# Patient Record
Sex: FEMALE | Race: WHITE | HISPANIC OR LATINO | Employment: FULL TIME | URBAN - METROPOLITAN AREA
[De-identification: names, ages, dates, MRNs, and addresses within clinical notes are randomized per-mention and may not be internally consistent; named-entity substitution may affect disease eponyms.]

---
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
## 2021-02-11 ENCOUNTER — HOSPITAL ENCOUNTER (OUTPATIENT)
Facility: MEDICAL CENTER | Age: 21
End: 2021-02-11
Attending: NURSE PRACTITIONER
Payer: COMMERCIAL

## 2021-02-11 ENCOUNTER — EMPLOYEE HEALTH (OUTPATIENT)
Dept: OCCUPATIONAL MEDICINE | Facility: CLINIC | Age: 21
End: 2021-02-11

## 2021-02-11 ENCOUNTER — EH NON-PROVIDER (OUTPATIENT)
Dept: OCCUPATIONAL MEDICINE | Facility: CLINIC | Age: 21
End: 2021-02-11

## 2021-02-11 DIAGNOSIS — Z02.1 PRE-EMPLOYMENT HEALTH SCREENING EXAMINATION: ICD-10-CM

## 2021-02-11 DIAGNOSIS — Z02.1 PRE-EMPLOYMENT DRUG SCREENING: Primary | ICD-10-CM

## 2021-02-11 DIAGNOSIS — Z02.1 PRE-EMPLOYMENT DRUG SCREENING: ICD-10-CM

## 2021-02-11 LAB
AMP AMPHETAMINE: NORMAL
BAR BARBITURATES: NORMAL
BZO BENZODIAZEPINES: NORMAL
COC COCAINE: NORMAL
INT CON NEG: NORMAL
INT CON POS: NORMAL
MDMA ECSTASY: NORMAL
MET METHAMPHETAMINES: NORMAL
MTD METHADONE: NORMAL
OPI OPIATES: NORMAL
OXY OXYCODONE: NORMAL
PCP PHENCYCLIDINE: NORMAL
POC URINE DRUG SCREEN OCDRS: NEGATIVE
THC: NORMAL

## 2021-02-11 PROCEDURE — 8915 PR COMPREHENSIVE PHYSICAL: Performed by: PREVENTIVE MEDICINE

## 2021-02-11 PROCEDURE — 94375 RESPIRATORY FLOW VOLUME LOOP: CPT | Performed by: NURSE PRACTITIONER

## 2021-02-11 PROCEDURE — 90686 IIV4 VACC NO PRSV 0.5 ML IM: CPT | Performed by: NURSE PRACTITIONER

## 2021-02-11 PROCEDURE — 80305 DRUG TEST PRSMV DIR OPT OBS: CPT | Performed by: NURSE PRACTITIONER

## 2021-02-11 PROCEDURE — 90715 TDAP VACCINE 7 YRS/> IM: CPT | Performed by: NURSE PRACTITIONER

## 2021-02-11 PROCEDURE — 86480 TB TEST CELL IMMUN MEASURE: CPT | Performed by: NURSE PRACTITIONER

## 2021-02-15 LAB
GAMMA INTERFERON BACKGROUND BLD IA-ACNC: 0.33 IU/ML
M TB IFN-G BLD-IMP: NEGATIVE
M TB IFN-G CD4+ BCKGRND COR BLD-ACNC: -0.02 IU/ML
MITOGEN IGNF BCKGRD COR BLD-ACNC: 5.9 IU/ML
QFT TB2 - NIL TBQ2: -0.04 IU/ML

## 2021-02-22 ENCOUNTER — EH NON-PROVIDER (OUTPATIENT)
Dept: OCCUPATIONAL MEDICINE | Facility: CLINIC | Age: 21
End: 2021-02-22

## 2021-02-22 DIAGNOSIS — Z71.85 IMMUNIZATION COUNSELING: ICD-10-CM

## 2021-09-02 ENCOUNTER — HOSPITAL ENCOUNTER (OUTPATIENT)
Facility: MEDICAL CENTER | Age: 21
End: 2021-09-02
Attending: NURSE PRACTITIONER
Payer: OTHER GOVERNMENT

## 2021-09-02 ENCOUNTER — OFFICE VISIT (OUTPATIENT)
Dept: URGENT CARE | Facility: CLINIC | Age: 21
End: 2021-09-02
Payer: OTHER GOVERNMENT

## 2021-09-02 VITALS
RESPIRATION RATE: 16 BRPM | SYSTOLIC BLOOD PRESSURE: 110 MMHG | OXYGEN SATURATION: 97 % | TEMPERATURE: 98.4 F | HEIGHT: 66 IN | BODY MASS INDEX: 22.95 KG/M2 | WEIGHT: 142.8 LBS | HEART RATE: 82 BPM | DIASTOLIC BLOOD PRESSURE: 60 MMHG

## 2021-09-02 DIAGNOSIS — J06.9 UPPER RESPIRATORY INFECTION, ACUTE: Primary | ICD-10-CM

## 2021-09-02 DIAGNOSIS — R53.83 FATIGUE, UNSPECIFIED TYPE: ICD-10-CM

## 2021-09-02 DIAGNOSIS — R09.81 NASAL CONGESTION: ICD-10-CM

## 2021-09-02 DIAGNOSIS — R05.9 COUGH: ICD-10-CM

## 2021-09-02 DIAGNOSIS — R07.89 CHEST PRESSURE: ICD-10-CM

## 2021-09-02 LAB — COVID ORDER STATUS COVID19: NORMAL

## 2021-09-02 PROCEDURE — U0003 INFECTIOUS AGENT DETECTION BY NUCLEIC ACID (DNA OR RNA); SEVERE ACUTE RESPIRATORY SYNDROME CORONAVIRUS 2 (SARS-COV-2) (CORONAVIRUS DISEASE [COVID-19]), AMPLIFIED PROBE TECHNIQUE, MAKING USE OF HIGH THROUGHPUT TECHNOLOGIES AS DESCRIBED BY CMS-2020-01-R: HCPCS

## 2021-09-02 PROCEDURE — U0005 INFEC AGEN DETEC AMPLI PROBE: HCPCS

## 2021-09-02 PROCEDURE — 99203 OFFICE O/P NEW LOW 30 MIN: CPT | Performed by: NURSE PRACTITIONER

## 2021-09-02 NOTE — LETTER
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  A concern for COVID-19 was identified and testing was done.  We are asking that you excuse work absences while following the self-isolation protocol per the Center for Disease Control (CDC) guidelines.  Your employee is able to access the test results through [facility]'s electronic delivery system called [employer].   If the results of testing were negative, and once there has been no fever(temperature greater than 100.4 °F) for at least 48 hours without taking any acetaminophen or ibuprofen, and no vomiting or diarrhea for at least 48 hours, then return to work is approved without restrictions.  If the results of the testing were positive, your employee will be contacted by the [facility] or [facility] department for further instructions on duration of self-isolation and return to work protocol.  In general this will also allow the CDC guidelines with a minimum of 10 days from the onset of symptoms and without fever, vomiting, or diarrhea.    In general repeat testing is not necessary and not offered through [facility].  Repeat testing is also not recommended by the CDC.  This is the only note that will be provided from the [facility] for this illness. \      Sincerely,        APOORVA Wright.  Electronically Signed

## 2021-09-03 LAB
SARS-COV-2 RNA RESP QL NAA+PROBE: NOTDETECTED
SPECIMEN SOURCE: NORMAL

## 2021-09-09 ASSESSMENT — ENCOUNTER SYMPTOMS
NAUSEA: 0
SINUS PAIN: 0
EYE REDNESS: 0
CHILLS: 0
FEVER: 0
PALPITATIONS: 0
HEADACHES: 0
EYE DISCHARGE: 0
COUGH: 1
SHORTNESS OF BREATH: 0
SORE THROAT: 0

## 2021-09-09 ASSESSMENT — LIFESTYLE VARIABLES: SUBSTANCE_ABUSE: 0

## 2021-09-09 NOTE — PROGRESS NOTES
Karrie Yousif is a 20 y.o. female who presents for Chest Pressure (runny nose, congested, cough, burning feeling, x1 day wants an xray )      HPI this new problem.  Karrie is a 20-year-old female presents with chest pressure, runny nose, nasal congestion, fatigue, cough, burning feeling in her chest.  She is wondering if she needs a chest x-ray.  She is not vaccinated against COVID-19 virus.  She has no known exposures to persons with Covid.  She has no history of pneumonia.  No underlying cardiopulmonary disorders.  She reports that she is normally healthy and well but her chest feels different.  She denies shortness of breath but has to think about her breathing more and take deep breaths more.  Her cough is nonproductive.  Treatments tried none.  No other aggravating or alleviating factors.    Review of Systems   Constitutional: Negative for chills, fever and malaise/fatigue.   HENT: Positive for congestion. Negative for ear pain, sinus pain and sore throat.    Eyes: Negative for discharge and redness.   Respiratory: Positive for cough. Negative for shortness of breath.    Cardiovascular: Negative for chest pain and palpitations.   Gastrointestinal: Negative for nausea.   Neurological: Negative for headaches.   Endo/Heme/Allergies: Negative for environmental allergies.   Psychiatric/Behavioral: Negative for substance abuse.       No Known Allergies  History reviewed. No pertinent past medical history.  History reviewed. No pertinent surgical history.  History reviewed. No pertinent family history.  Social History     Tobacco Use   • Smoking status: Never Smoker   Substance Use Topics   • Alcohol use: No     There is no problem list on file for this patient.    No current outpatient medications on file prior to visit.     No current facility-administered medications on file prior to visit.          Objective:     /60 (BP Location: Right arm, Patient Position: Sitting, BP Cuff Size: Adult long)   Pulse 82  "  Temp 36.9 °C (98.4 °F) (Temporal)   Resp 16   Ht 1.676 m (5' 6\")   Wt 64.8 kg (142 lb 12.8 oz)   SpO2 97%   BMI 23.05 kg/m²     Physical Exam  Vitals and nursing note reviewed.   Constitutional:       General: She is not in acute distress.     Appearance: Normal appearance. She is well-developed and normal weight. She is not ill-appearing or toxic-appearing.   HENT:      Head: Normocephalic.      Right Ear: Hearing, ear canal and external ear normal. No middle ear effusion. Tympanic membrane is injected. Tympanic membrane is not erythematous.      Left Ear: Hearing, ear canal and external ear normal.  No middle ear effusion. Tympanic membrane is injected. Tympanic membrane is not erythematous.      Nose: Congestion and rhinorrhea present. No mucosal edema.      Right Sinus: No maxillary sinus tenderness or frontal sinus tenderness.      Left Sinus: No maxillary sinus tenderness or frontal sinus tenderness.      Mouth/Throat:      Pharynx: Uvula midline. Posterior oropharyngeal erythema (mild) present. No oropharyngeal exudate.      Tonsils: No tonsillar abscesses.   Eyes:      Pupils: Pupils are equal, round, and reactive to light.   Neck:      Trachea: Trachea normal.   Cardiovascular:      Rate and Rhythm: Normal rate and regular rhythm.      Chest Wall: PMI is not displaced.      Pulses: Normal pulses.      Heart sounds: Normal heart sounds.   Pulmonary:      Effort: Pulmonary effort is normal. No respiratory distress.      Breath sounds: Normal breath sounds. No decreased breath sounds, wheezing, rhonchi or rales.   Abdominal:      Palpations: Abdomen is soft.      Tenderness: There is no abdominal tenderness.   Musculoskeletal:         General: Normal range of motion.      Cervical back: Full passive range of motion without pain, normal range of motion and neck supple.   Lymphadenopathy:      Cervical: No cervical adenopathy.      Upper Body:      Right upper body: No supraclavicular adenopathy.      " Left upper body: No supraclavicular adenopathy.   Skin:     General: Skin is warm and dry.      Capillary Refill: Capillary refill takes less than 2 seconds.   Neurological:      Mental Status: She is alert and oriented to person, place, and time.      Gait: Gait normal.   Psychiatric:         Mood and Affect: Mood normal.         Speech: Speech normal.         Behavior: Behavior normal.         Thought Content: Thought content normal.         Assessment /Associated Orders:      1. Upper respiratory infection, acute     2. Cough  SARS-CoV-2 PCR (24 hour In-House): Collect NP swab in [facility]   3. Nasal congestion     4. Fatigue, unspecified type     5. Chest pressure           Medical Decision Making:    Pt is clinically stable at today's acute urgent care visit.  No acute distress noted. Appropriate for outpatient management at this time.   Her lungs are clear today.  There is no indication for chest x-ray.  COVID PCR testing - pending- (results to be released to [employer] if available)   She was educated that testing today could lead to a false negative test as she has only had symptoms for approximately 24 hours.  She verbalizes understanding.  She agrees to retest if her symptoms persist.    Self Quarantine per CDC guidelines  Educated in infection control practices.   Discussed that this illness was most likely viral in nature. Did not see any evidence of a bacterial process.   OTC  analgesic/ antipyretic of choice ( acetaminophen or NSAID). Follow manufactures dosing and safety precautions.   OTC medications for symptomatic relief of symptoms  Keep well hydrated  Increase rest    Advised to follow-up with the primary care provider  for recheck, reevaluation, and consideration of further management if necessary.   Discussed management options (risks,benefits, and alternatives to treatment). Pt/ caregiver expressed understanding and the treatment plan was agreed upon. Questions were encouraged and answered     Return  to urgent care prn if new or worsening sx or if there is no improvement in condition prn . Red flags discussed and indications to immediately call 911 or present to the Emergency Department.     I personally reviewed prior external notes and test results pertinent to today's visit.  I have independently reviewed and interpreted all diagnostics ordered during this urgent care acute visit.   Time spent evaluating this patient was at least 30 minutes and includes preparing for visit, counseling/education, exam and evaluation, obtaining history, independent interpretation, ordering lab/test/procedures,medication management and documentation.This does not include time spent on separate billable procedures.           Please note that this dictation was created using voice recognition software. I have made a reasonable attempt to correct obvious errors, but I expect that there are errors of grammar and possibly content that I did not discover before finalizing the note.    This note was electronically signed by Tiffany CAMARILLO, Urgent Care

## 2021-11-08 ENCOUNTER — HOSPITAL ENCOUNTER (OUTPATIENT)
Facility: MEDICAL CENTER | Age: 21
End: 2021-11-08
Attending: STUDENT IN AN ORGANIZED HEALTH CARE EDUCATION/TRAINING PROGRAM

## 2021-11-08 ENCOUNTER — OFFICE VISIT (OUTPATIENT)
Dept: URGENT CARE | Facility: CLINIC | Age: 21
End: 2021-11-08

## 2021-11-08 VITALS
OXYGEN SATURATION: 97 % | HEART RATE: 69 BPM | WEIGHT: 141 LBS | SYSTOLIC BLOOD PRESSURE: 102 MMHG | TEMPERATURE: 97.3 F | RESPIRATION RATE: 16 BRPM | BODY MASS INDEX: 22.66 KG/M2 | HEIGHT: 66 IN | DIASTOLIC BLOOD PRESSURE: 72 MMHG

## 2021-11-08 DIAGNOSIS — N76.0 VAGINOSIS: ICD-10-CM

## 2021-11-08 DIAGNOSIS — Z20.2 POSSIBLE EXPOSURE TO STD: ICD-10-CM

## 2021-11-08 PROCEDURE — 87491 CHLMYD TRACH DNA AMP PROBE: CPT

## 2021-11-08 PROCEDURE — 99213 OFFICE O/P EST LOW 20 MIN: CPT | Performed by: STUDENT IN AN ORGANIZED HEALTH CARE EDUCATION/TRAINING PROGRAM

## 2021-11-08 PROCEDURE — 87510 GARDNER VAG DNA DIR PROBE: CPT

## 2021-11-08 PROCEDURE — 87480 CANDIDA DNA DIR PROBE: CPT

## 2021-11-08 PROCEDURE — 87660 TRICHOMONAS VAGIN DIR PROBE: CPT

## 2021-11-08 PROCEDURE — 87591 N.GONORRHOEAE DNA AMP PROB: CPT

## 2021-11-08 NOTE — PROGRESS NOTES
"Subjective:   CHIEF COMPLAINT  Chief Complaint   Patient presents with   • Other     foul odor x 1 week - would like STD testing       HPI  Karrie Yousif is a 21 y.o. female who presents requesting be tested for STDs. Says she recently got out of relation ship with her boyfriend would like to be tested.  For the last week she has been having a foul odor from her vagina associated with some white discharge.  No dysuria or hematuria.  Started her menstrual cycle yesterday.  No history of STIs.    REVIEW OF SYSTEMS  General: no fever or chills  GI: no nausea or vomiting  See HPI for further details.    PAST MEDICAL HISTORY  There are no problems to display for this patient.      SURGICAL HISTORY  patient denies any surgical history    ALLERGIES  No Known Allergies    CURRENT MEDICATIONS  Home Medications     Reviewed by Tao Cid'alfonso (Medical Assistant) on 11/08/21 at 0930  Med List Status: <None>   Medication Last Dose Status        Patient Michael Taking any Medications                       SOCIAL HISTORY  Social History     Tobacco Use   • Smoking status: Never Smoker   • Smokeless tobacco: Never Used   Substance and Sexual Activity   • Alcohol use: Yes     Comment: occasional   • Drug use: No   • Sexual activity: Yes     Partners: Male     Birth control/protection: Condom       FAMILY HISTORY  No family history on file.       Objective:   PHYSICAL EXAM  VITAL SIGNS: /72 (BP Location: Left arm, Patient Position: Sitting, BP Cuff Size: Adult long)   Pulse 69   Temp 36.3 °C (97.3 °F) (Temporal)   Resp 16   Ht 1.676 m (5' 6\") Comment: pt stated  Wt 64 kg (141 lb)   LMP 11/07/2021 (Exact Date)   SpO2 97%   Breastfeeding No   BMI 22.76 kg/m²     Gen: no acute distress, normal voice  Skin: dry, intact, moist mucosal membranes  Head: Atraumatic, normocephalic  Psych: normal affect, normal judgement, alert, awake         Assessment/Plan:     1. Vaginosis  Chlamydia/GC PCR Urine Or Swab    " VAGINAL PATHOGENS DNA PANEL   2. Possible exposure to STD  Chlamydia/GC PCR Urine Or Swab   Suspect underlying BV infection.  Will get confirmation through vaginal pathogen swab before starting medication.  Patient currently on menstrual cycle.  -Ordered vaginal pathogens  -Order GC/CT  -Contact patient with results at [phone].  Okay to leave voicemail.    Differential diagnosis, natural history, supportive care, and indications for immediate follow-up discussed. All questions answered. Patient agrees with the plan of care.    Follow-up as needed if symptoms worsen or fail to improve to PCP, Urgent care or Emergency Room.    Please note that this dictation was created using voice recognition software. I have made a reasonable attempt to correct obvious errors, but I expect that there are errors of grammar and possibly content that I did not discover before finalizing the note.

## 2021-11-09 DIAGNOSIS — N76.0 VAGINOSIS: ICD-10-CM

## 2021-11-09 DIAGNOSIS — Z20.2 POSSIBLE EXPOSURE TO STD: ICD-10-CM

## 2021-11-09 LAB
C TRACH DNA SPEC QL NAA+PROBE: NEGATIVE
CANDIDA DNA VAG QL PROBE+SIG AMP: NEGATIVE
G VAGINALIS DNA VAG QL PROBE+SIG AMP: POSITIVE
N GONORRHOEA DNA SPEC QL NAA+PROBE: NEGATIVE
SPECIMEN SOURCE: NORMAL
T VAGINALIS DNA VAG QL PROBE+SIG AMP: NEGATIVE

## 2021-11-10 ENCOUNTER — TELEPHONE (OUTPATIENT)
Dept: URGENT CARE | Facility: CLINIC | Age: 21
End: 2021-11-10

## 2021-11-10 DIAGNOSIS — N76.0 BACTERIAL VAGINOSIS: ICD-10-CM

## 2021-11-10 DIAGNOSIS — B96.89 BACTERIAL VAGINOSIS: ICD-10-CM

## 2021-11-10 RX ORDER — METRONIDAZOLE 500 MG/1
500 TABLET ORAL 2 TIMES DAILY
Qty: 14 TABLET | Refills: 0 | Status: SHIPPED | OUTPATIENT
Start: 2021-11-10 | End: 2023-02-23

## 2021-11-10 NOTE — TELEPHONE ENCOUNTER
I contacted the patient and left her a voicemail informing her of the positive BV test result.  A prescription for Flagyl was sent to her pharmacy.  She was instructed to refrain from drinking alcohol while taking the medication.  Remaining labs were negative.

## 2022-11-29 ENCOUNTER — OFFICE VISIT (OUTPATIENT)
Dept: URGENT CARE | Facility: CLINIC | Age: 22
End: 2022-11-29
Payer: OTHER GOVERNMENT

## 2022-11-29 VITALS
SYSTOLIC BLOOD PRESSURE: 116 MMHG | HEIGHT: 66 IN | DIASTOLIC BLOOD PRESSURE: 70 MMHG | TEMPERATURE: 97.5 F | BODY MASS INDEX: 24.08 KG/M2 | HEART RATE: 82 BPM | OXYGEN SATURATION: 97 % | WEIGHT: 149.8 LBS | RESPIRATION RATE: 17 BRPM

## 2022-11-29 DIAGNOSIS — J02.0 PHARYNGITIS DUE TO STREPTOCOCCUS SPECIES: ICD-10-CM

## 2022-11-29 DIAGNOSIS — R52 BODY ACHES: ICD-10-CM

## 2022-11-29 LAB
EXTERNAL QUALITY CONTROL: NORMAL
INT CON NEG: NORMAL
INT CON NEG: NORMAL
INT CON POS: NORMAL
INT CON POS: NORMAL
S PYO AG THROAT QL: POSITIVE
SARS-COV+SARS-COV-2 AG RESP QL IA.RAPID: NEGATIVE

## 2022-11-29 PROCEDURE — 99213 OFFICE O/P EST LOW 20 MIN: CPT | Performed by: NURSE PRACTITIONER

## 2022-11-29 PROCEDURE — 87880 STREP A ASSAY W/OPTIC: CPT | Performed by: NURSE PRACTITIONER

## 2022-11-29 PROCEDURE — 87426 SARSCOV CORONAVIRUS AG IA: CPT | Performed by: NURSE PRACTITIONER

## 2022-11-29 RX ORDER — AMOXICILLIN 500 MG/1
500 CAPSULE ORAL 2 TIMES DAILY
Qty: 20 CAPSULE | Refills: 0 | Status: SHIPPED | OUTPATIENT
Start: 2022-11-29 | End: 2022-12-09

## 2022-11-29 ASSESSMENT — ENCOUNTER SYMPTOMS
COUGH: 0
HEADACHES: 0
VOMITING: 0
SWOLLEN GLANDS: 0
SHORTNESS OF BREATH: 1
TROUBLE SWALLOWING: 0
EYE REDNESS: 0
FEVER: 0
CHILLS: 1
NAUSEA: 0
MYALGIAS: 0
NECK PAIN: 0
DIZZINESS: 0
SORE THROAT: 1

## 2022-11-29 NOTE — PROGRESS NOTES
Subjective:   Karrie Yousif is a 22 y.o. female who presents for Shortness of Breath, Body Aches, and Pharyngitis      Pharyngitis   This is a new problem. The current episode started yesterday. The problem has been unchanged. Neither side of throat is experiencing more pain than the other. There has been no fever. The pain is at a severity of 4/10. The pain is moderate. Associated symptoms include shortness of breath. Pertinent negatives include no congestion, coughing, ear discharge, headaches, plugged ear sensation, neck pain, swollen glands, trouble swallowing or vomiting. She has had exposure to strep. She has had no exposure to mono. She has tried nothing for the symptoms. The treatment provided no relief.     Review of Systems   Constitutional:  Positive for chills and malaise/fatigue. Negative for fever.   HENT:  Positive for sore throat. Negative for congestion, ear discharge and trouble swallowing.    Eyes:  Negative for redness.   Respiratory:  Positive for shortness of breath. Negative for cough.    Cardiovascular:  Negative for chest pain.   Gastrointestinal:  Negative for nausea and vomiting.   Genitourinary:  Negative for dysuria.   Musculoskeletal:  Negative for myalgias and neck pain.   Skin:  Negative for rash.   Neurological:  Negative for dizziness and headaches.     Medications:    amoxicillin Caps  metroNIDAZOLE Tabs    Allergies: Patient has no known allergies.    Problem List: Karrie Yousif does not have a problem list on file.    Surgical History:  No past surgical history on file.    Past Social Hx: Karrie Yousif  reports that she has never smoked. She has never used smokeless tobacco. She reports current alcohol use. She reports that she does not use drugs.     Past Family Hx:  Karrie Yousif family history is not on file.     Problem list, medications, and allergies reviewed by myself today in Epic.     Objective:     /70 (BP Location: Right arm, Patient Position: Sitting, BP  "Cuff Size: Adult long)   Pulse 82   Temp 36.4 °C (97.5 °F) (Temporal)   Resp 17   Ht 1.676 m (5' 6\")   Wt 67.9 kg (149 lb 12.8 oz)   SpO2 97%   BMI 24.18 kg/m²     Physical Exam  Vitals and nursing note reviewed.   Constitutional:       General: She is not in acute distress.     Appearance: She is well-developed.   HENT:      Head: Normocephalic and atraumatic.      Right Ear: Tympanic membrane and external ear normal.      Left Ear: Tympanic membrane and external ear normal.      Nose: Nose normal.      Right Sinus: No maxillary sinus tenderness or frontal sinus tenderness.      Left Sinus: No maxillary sinus tenderness or frontal sinus tenderness.      Mouth/Throat:      Mouth: Mucous membranes are moist.      Pharynx: Uvula midline. Posterior oropharyngeal erythema present.      Tonsils: No tonsillar exudate or tonsillar abscesses.   Eyes:      General:         Right eye: No discharge.         Left eye: No discharge.      Conjunctiva/sclera: Conjunctivae normal.   Cardiovascular:      Rate and Rhythm: Normal rate.   Pulmonary:      Effort: Pulmonary effort is normal. No respiratory distress.      Breath sounds: Normal breath sounds.   Abdominal:      General: There is no distension.   Musculoskeletal:         General: Normal range of motion.   Lymphadenopathy:      Cervical: No cervical adenopathy.   Skin:     General: Skin is warm and dry.   Neurological:      General: No focal deficit present.      Mental Status: She is alert and oriented to person, place, and time. Mental status is at baseline.      Gait: Gait (gait at baseline) normal.   Psychiatric:         Judgment: Judgment normal.       Assessment/Plan:     Diagnosis and associated orders:     1. Pharyngitis due to Streptococcus species  amoxicillin (AMOXIL) 500 MG Cap    POCT SARS-COV Antigen SHARON (Symptomatic only)    POCT Rapid Strep A      2. Body aches           Comments/MDM:   Covid negaitve  POSITIVE Strep A.  Recommend warm salt water " gargles, soft foods, cool liquids, ibuprofen and Tylenol for any pain or fevers.   Return to the urgent care if symptoms are not improving or any worsening symptoms or concerns. Present to the emergency room or call 911 if any severe swelling of the throat, difficulty swallowing, difficulty breathing, wheezing, or any other severe concerns.                    Please note that this dictation was created using voice recognition software. I have made a reasonable attempt to correct obvious errors, but I expect that there are errors of grammar and possibly content that I did not discover before finalizing the note.    This note was electronically signed by Oscar CAMARILLO.

## 2022-11-29 NOTE — LETTER
November 29, 2022         Patient: Karrie Yousif   YOB: 2000   Date of Visit: 11/29/2022           To Whom it May Concern:    Karrie Yousif was seen in my clinic on 11/29/2022. She may return to work on 12/1/22.    If you have any questions or concerns, please don't hesitate to call.        Sincerely,           NORAH Moran.  Electronically Signed

## 2023-01-26 ENCOUNTER — OFFICE VISIT (OUTPATIENT)
Dept: URGENT CARE | Facility: CLINIC | Age: 23
End: 2023-01-26
Payer: OTHER GOVERNMENT

## 2023-01-26 ENCOUNTER — APPOINTMENT (OUTPATIENT)
Dept: RADIOLOGY | Facility: IMAGING CENTER | Age: 23
End: 2023-01-26
Attending: NURSE PRACTITIONER
Payer: OTHER GOVERNMENT

## 2023-01-26 VITALS
HEIGHT: 66 IN | TEMPERATURE: 96.8 F | OXYGEN SATURATION: 97 % | HEART RATE: 73 BPM | RESPIRATION RATE: 18 BRPM | DIASTOLIC BLOOD PRESSURE: 80 MMHG | SYSTOLIC BLOOD PRESSURE: 106 MMHG | WEIGHT: 148 LBS | BODY MASS INDEX: 23.78 KG/M2

## 2023-01-26 DIAGNOSIS — M25.561 ACUTE PAIN OF RIGHT KNEE: ICD-10-CM

## 2023-01-26 PROCEDURE — 73562 X-RAY EXAM OF KNEE 3: CPT | Mod: TC,RT | Performed by: NURSE PRACTITIONER

## 2023-01-26 PROCEDURE — 99213 OFFICE O/P EST LOW 20 MIN: CPT | Performed by: NURSE PRACTITIONER

## 2023-01-27 ASSESSMENT — ENCOUNTER SYMPTOMS
CARDIOVASCULAR NEGATIVE: 1
NEUROLOGICAL NEGATIVE: 1
FEVER: 0
CONSTITUTIONAL NEGATIVE: 1
RESPIRATORY NEGATIVE: 1
EYES NEGATIVE: 1
GASTROINTESTINAL NEGATIVE: 1
PSYCHIATRIC NEGATIVE: 1

## 2023-01-27 NOTE — PROGRESS NOTES
"Subjective:   Karrie Yousif is a 22 y.o. female who presents for Knee Injury (X6 days, right knee injury)      Patient presents with acute right knee pain status post skiing injury about 6 days ago.  Patient states she was skiing, and her skis got crossed and she fell directly onto her left knee.  She states she did not feel and hear a pop upon impact.  Patient states that she had immediate onset of pain and swelling, but did go home and use ice, elevation and ibuprofen.  She states that unfortunately, the pain is worsening and the function is worsening.  She reports the bruising has begun to resolve, but she has to sleep with her leg rotated out to be able to get any sleep at all.  She even has difficulty sitting, due to pain.  She can weight-bear somewhat, but this is also painful.  She is unable to twist.  She denies any numbness or tingling.  She denies radiation of the pain.    Knee Injury  This is a new problem. The current episode started in the past 7 days (6 days ago). The problem occurs constantly. The problem has been gradually worsening. Pertinent negatives include no fever. The symptoms are aggravated by walking, twisting and standing. She has tried acetaminophen, NSAIDs, ice and position changes for the symptoms. The treatment provided mild relief.     Review of Systems   Constitutional: Negative.  Negative for fever.   HENT: Negative.     Eyes: Negative.    Respiratory: Negative.     Cardiovascular: Negative.    Gastrointestinal: Negative.    Musculoskeletal:         Right knee pain   Skin: Negative.    Neurological: Negative.    Psychiatric/Behavioral: Negative.       Medications, Allergies, and current problem list reviewed today in Epic.     Objective:     /80   Pulse 73   Temp 36 °C (96.8 °F) (Temporal)   Resp 18   Ht 1.676 m (5' 6\")   Wt 67.1 kg (148 lb)   SpO2 97%     Physical Exam  Vitals reviewed.   Constitutional:       Appearance: Normal appearance.   HENT:      Head: " Normocephalic and atraumatic.      Right Ear: Tympanic membrane, ear canal and external ear normal.      Left Ear: Tympanic membrane, ear canal and external ear normal.      Nose: Nose normal.      Mouth/Throat:      Mouth: Mucous membranes are moist.      Pharynx: Oropharynx is clear.   Eyes:      Extraocular Movements: Extraocular movements intact.      Conjunctiva/sclera: Conjunctivae normal.      Pupils: Pupils are equal, round, and reactive to light.   Cardiovascular:      Rate and Rhythm: Normal rate and regular rhythm.   Pulmonary:      Effort: Pulmonary effort is normal.      Breath sounds: Normal breath sounds.   Abdominal:      General: Abdomen is flat.      Palpations: Abdomen is soft.   Musculoskeletal:      Cervical back: Normal range of motion and neck supple.      Right knee: Swelling, effusion and ecchymosis present. Decreased range of motion. Tenderness present over the MCL. Abnormal meniscus.      Left knee: Normal.   Skin:     General: Skin is warm and dry.      Capillary Refill: Capillary refill takes less than 2 seconds.   Neurological:      General: No focal deficit present.      Mental Status: She is alert.   Psychiatric:         Mood and Affect: Mood normal.         Behavior: Behavior normal.       RADIOLOGY RESULTS   DX-KNEE 3 VIEWS RIGHT    Result Date: 1/26/2023 1/26/2023 4:39 PM HISTORY/REASON FOR EXAM:  Pain/Deformity Following Trauma. Injury TECHNIQUE/EXAM DESCRIPTION AND NUMBER OF VIEWS:  3 views of the RIGHT knee. COMPARISON: None FINDINGS: There is no fracture or dislocation. The visualized osseous structures are in anatomic alignment. Joint spaces are preserved. Bone mineralization is age-appropriate.. Small joint effusion.     No acute fracture or dislocation. Small joint effusion. If there is concern for internal derangement, MRI may be performed.           Assessment/Plan:     Diagnosis and associated orders:     1. Acute pain of right knee  DX-KNEE 3 VIEWS RIGHT    Referral to  Orthopedics         Comments/MDM:     Patient's knee x-rays are negative for fracture or dislocation, but do indicate an effusion.  Patient will be placed in a hinged knee brace, and referred urgently to orthopedics.  Discussed with patient that I suspect she has internal derangement type injury, and does need further evaluation and treatment by an orthopedic specialist.  Urgent referral was placed for patient today.  In the meantime, patient will continue to use ice, rest, elevation and anti-inflammatories.  She will follow up in ER for any acute worsening.          Differential diagnosis, natural history, supportive care, and indications for immediate follow-up discussed.    Advised the patient to follow-up with the primary care physician for recheck, reevaluation, and consideration of further management.    Please note that this dictation was created using voice recognition software. I have made a reasonable attempt to correct obvious errors, but I expect that there are errors of grammar and possibly content that I did not discover before finalizing the note.    This note was electronically signed by MARQUISE Myles

## 2023-02-21 PROBLEM — S83.511A SPRAIN OF ANTERIOR CRUCIATE LIGAMENT OF RIGHT KNEE: Status: ACTIVE | Noted: 2023-02-21

## 2023-10-26 ENCOUNTER — OFFICE VISIT (OUTPATIENT)
Dept: URGENT CARE | Facility: CLINIC | Age: 23
End: 2023-10-26
Payer: OTHER GOVERNMENT

## 2023-10-26 VITALS
TEMPERATURE: 98.1 F | SYSTOLIC BLOOD PRESSURE: 126 MMHG | HEART RATE: 71 BPM | OXYGEN SATURATION: 98 % | DIASTOLIC BLOOD PRESSURE: 70 MMHG | RESPIRATION RATE: 20 BRPM | WEIGHT: 158.8 LBS | HEIGHT: 66 IN | BODY MASS INDEX: 25.52 KG/M2

## 2023-10-26 DIAGNOSIS — R53.83 FATIGUE, UNSPECIFIED TYPE: ICD-10-CM

## 2023-10-26 DIAGNOSIS — R05.1 ACUTE COUGH: ICD-10-CM

## 2023-10-26 DIAGNOSIS — J02.9 SORE THROAT: ICD-10-CM

## 2023-10-26 DIAGNOSIS — R68.83 CHILLS: ICD-10-CM

## 2023-10-26 DIAGNOSIS — R09.81 NASAL CONGESTION: ICD-10-CM

## 2023-10-26 LAB
FLUAV RNA SPEC QL NAA+PROBE: NEGATIVE
FLUBV RNA SPEC QL NAA+PROBE: NEGATIVE
RSV RNA SPEC QL NAA+PROBE: NEGATIVE
S PYO DNA SPEC NAA+PROBE: NOT DETECTED
SARS-COV-2 RNA RESP QL NAA+PROBE: NEGATIVE

## 2023-10-26 PROCEDURE — 3074F SYST BP LT 130 MM HG: CPT

## 2023-10-26 PROCEDURE — 99213 OFFICE O/P EST LOW 20 MIN: CPT

## 2023-10-26 PROCEDURE — 87651 STREP A DNA AMP PROBE: CPT

## 2023-10-26 PROCEDURE — 0241U POCT CEPHEID COV-2, FLU A/B, RSV - PCR: CPT

## 2023-10-26 PROCEDURE — 3078F DIAST BP <80 MM HG: CPT

## 2023-10-26 RX ORDER — LIDOCAINE HYDROCHLORIDE 20 MG/ML
15 SOLUTION OROPHARYNGEAL PRN
Qty: 100 ML | Refills: 0 | Status: SHIPPED | OUTPATIENT
Start: 2023-10-26

## 2023-10-26 RX ORDER — DEXTROMETHORPHAN HYDROBROMIDE AND PROMETHAZINE HYDROCHLORIDE 15; 6.25 MG/5ML; MG/5ML
5 SYRUP ORAL EVERY 6 HOURS PRN
Qty: 118 ML | Refills: 0 | Status: SHIPPED | OUTPATIENT
Start: 2023-10-26 | End: 2023-11-02

## 2023-10-26 RX ORDER — FLUTICASONE PROPIONATE 50 MCG
1 SPRAY, SUSPENSION (ML) NASAL DAILY
Qty: 16 G | Refills: 0 | Status: SHIPPED | OUTPATIENT
Start: 2023-10-26

## 2023-10-26 RX ORDER — BENZONATATE 100 MG/1
100 CAPSULE ORAL 3 TIMES DAILY PRN
Qty: 30 CAPSULE | Refills: 0 | Status: SHIPPED | OUTPATIENT
Start: 2023-10-26 | End: 2023-11-05

## 2023-10-26 NOTE — PROGRESS NOTES
"Subjective     Karrie Yousif is a 23 y.o. female who presents with Cough (X4days Chest congestion/green/yellow phlegm/fatigue/loss of smell/ throat discomfort/)            HPI patient states she started to feel sick Sunday night with a sore throat.  She has was dry and scratchy  She states she started trying to drink extra water but she is just gotten worse over the last few days  She states she has had increasing fatigue, cough and nasal congestion  Says the coughing fits been waking her up at night has been hard to get any rest  She says she has had some chills, she has not had any documented fever  She states she has been utilizing Tylenol, NyQuil and tea      ROS same as above     No Known Allergies    Current Outpatient Medications   Medication Sig    promethazine-dextromethorphan (PROMETHAZINE-DM) 6.25-15 MG/5ML syrup Take 5 mL by mouth every 6 hours as needed for Cough for up to 7 days. (caution: may cause sedation)    benzonatate (TESSALON) 100 MG Cap Take 1 Capsule by mouth 3 times a day as needed for Cough for up to 10 days.    lidocaine (XYLOCAINE) 2 % Solution Take 15 mL by mouth as needed for Throat/Mouth Pain.    fluticasone (FLONASE) 50 MCG/ACT nasal spray Administer 1 Spray into affected nostril(S) every day.        No past medical history on file.     Social History     Socioeconomic History    Marital status: Single   Tobacco Use    Smoking status: Never    Smokeless tobacco: Never   Vaping Use    Vaping Use: Never used   Substance and Sexual Activity    Alcohol use: Yes     Comment: occasional    Drug use: No    Sexual activity: Yes     Partners: Male     Birth control/protection: Condom              Objective     /70 (BP Location: Left arm, Patient Position: Sitting)   Pulse 71   Temp 36.7 °C (98.1 °F) (Temporal)   Resp 20   Ht 1.676 m (5' 6\")   Wt 72 kg (158 lb 12.8 oz)   LMP 10/11/2023 (Exact Date)   SpO2 98%   BMI 25.63 kg/m²      Physical Exam  Constitutional:       " Appearance: Normal appearance. She is not ill-appearing.   HENT:      Head: Normocephalic and atraumatic.      Right Ear: Tympanic membrane, ear canal and external ear normal.      Left Ear: Tympanic membrane, ear canal and external ear normal.      Nose: Congestion present.      Right Sinus: No maxillary sinus tenderness or frontal sinus tenderness.      Left Sinus: No maxillary sinus tenderness or frontal sinus tenderness.      Mouth/Throat:      Mouth: Mucous membranes are moist.      Pharynx: Uvula midline. Posterior oropharyngeal erythema present. No pharyngeal swelling, oropharyngeal exudate or uvula swelling.   Eyes:      Conjunctiva/sclera: Conjunctivae normal.      Pupils: Pupils are equal, round, and reactive to light.   Cardiovascular:      Rate and Rhythm: Normal rate and regular rhythm.      Heart sounds: Normal heart sounds. No murmur heard.  Pulmonary:      Effort: Pulmonary effort is normal.      Breath sounds: No stridor. No wheezing, rhonchi or rales.   Abdominal:      Palpations: Abdomen is soft.   Musculoskeletal:         General: Normal range of motion.      Cervical back: Normal range of motion and neck supple.   Lymphadenopathy:      Head:      Right side of head: Submandibular and tonsillar adenopathy present. No submental, preauricular, posterior auricular or occipital adenopathy.      Left side of head: Submandibular and tonsillar adenopathy present. No submental, preauricular, posterior auricular or occipital adenopathy.      Cervical: Cervical adenopathy present.      Right cervical: Superficial cervical adenopathy present. No deep or posterior cervical adenopathy.     Left cervical: Superficial cervical adenopathy present. No deep or posterior cervical adenopathy.      Upper Body:      Right upper body: No supraclavicular adenopathy.      Left upper body: No supraclavicular adenopathy.   Skin:     General: Skin is warm and dry.   Neurological:      General: No focal deficit present.       Mental Status: She is alert and oriented to person, place, and time.           Assessment & Plan      Patient comes in today after being sick for approximately the past 4 days.  She states she started feeling sick last Sunday night.  She has had increasing fatigue, coughing fits that wake her up, sore throat and nasal congestion.  Says she has been trying to rest and have extra fluids but she is just not getting any better.    On exam tympanic membranes are pearly white bilaterally.  There is nasal congestion, no pain with palpation of maxillary and frontal sinuses bilaterally.  There is erythema to the posterior pharynx, no exudates, uvula remains midline, no edema.  There is submandibular, tonsillar, superficial cervical adenopathy bilaterally, tender to palpation.  Lung sounds are clear, no wheezing, no rhonchi, SPO2 98% clinic today.  Sounds are normal, regular rhythm, no murmur.  We discussed point-of-care testing in clinic today for strep and COVID/flu/RSV.  Discussed if she is positive for strep she would need to stay home until she has been on antibiotics for 24 hours, dispose of her toothbrush after 24 hours and obtain a new 1. We discussed CDC guidelines if she is positive for COVID.  Advised to continue with symptom management utilizing OTC Tylenol/ibuprofen, warm salt water gargles, humidifier, warm and cool soothing liquids, hot steamy showers.  Discussed we will notify her of her testing results via [employer] that she is active.  Verified her pharmacy of choice if any medications need to be called in.  Patient verbalized understanding and agreement with plan of care. Differential diagnosis, natural history, supportive care, and indications for immediate follow-up were discussed.    Note for work provided.        1. Acute cough  POCT CEPHEID COV-2, FLU A/B, RSV - PCR    POCT CEPHEID GROUP A STREP - PCR    promethazine-dextromethorphan (PROMETHAZINE-DM) 6.25-15 MG/5ML syrup    benzonatate (TESSALON) 100 MG  Cap      2. Nasal congestion  POCT CEPHEID COV-2, FLU A/B, RSV - PCR    POCT CEPHEID GROUP A STREP - PCR    fluticasone (FLONASE) 50 MCG/ACT nasal spray      3. Sore throat  POCT CEPHEID COV-2, FLU A/B, RSV - PCR    POCT CEPHEID GROUP A STREP - PCR    lidocaine (XYLOCAINE) 2 % Solution      4. Fatigue, unspecified type  POCT CEPHEID COV-2, FLU A/B, RSV - PCR    POCT CEPHEID GROUP A STREP - PCR      5. Chills  POCT CEPHEID COV-2, FLU A/B, RSV - PCR    POCT CEPHEID GROUP A STREP - PCR

## 2023-10-26 NOTE — LETTER
October 26, 2023         Patient: Karrie Yousif   YOB: 2000   Date of Visit: 10/26/2023           To Whom it May Concern:    Karrie Yousif was seen in my clinic on 10/26/2023. Please excuse any absences this week due to an acute illness.    If you have any questions or concerns, please don't hesitate to call.        Sincerely,           NORAH Bell.  Electronically Signed

## 2024-04-10 ENCOUNTER — OFFICE VISIT (OUTPATIENT)
Dept: URGENT CARE | Facility: CLINIC | Age: 24
End: 2024-04-10
Payer: OTHER GOVERNMENT

## 2024-04-10 VITALS
WEIGHT: 158 LBS | DIASTOLIC BLOOD PRESSURE: 68 MMHG | OXYGEN SATURATION: 97 % | TEMPERATURE: 98.1 F | HEIGHT: 66 IN | RESPIRATION RATE: 18 BRPM | SYSTOLIC BLOOD PRESSURE: 114 MMHG | BODY MASS INDEX: 25.39 KG/M2 | HEART RATE: 66 BPM

## 2024-04-10 DIAGNOSIS — B34.9 NONSPECIFIC SYNDROME SUGGESTIVE OF VIRAL ILLNESS: ICD-10-CM

## 2024-04-10 DIAGNOSIS — J02.9 PHARYNGITIS, UNSPECIFIED ETIOLOGY: ICD-10-CM

## 2024-04-10 PROCEDURE — 99213 OFFICE O/P EST LOW 20 MIN: CPT

## 2024-04-10 PROCEDURE — 3074F SYST BP LT 130 MM HG: CPT

## 2024-04-10 PROCEDURE — 0241U POCT CEPHEID COV-2, FLU A/B, RSV - PCR: CPT

## 2024-04-10 PROCEDURE — 3078F DIAST BP <80 MM HG: CPT

## 2024-04-10 PROCEDURE — 87651 STREP A DNA AMP PROBE: CPT

## 2024-04-10 ASSESSMENT — ENCOUNTER SYMPTOMS
DIARRHEA: 0
SORE THROAT: 1
COUGH: 1
VOMITING: 0
FEVER: 0
CHILLS: 0

## 2024-04-10 NOTE — LETTER
April 10, 2024    To Whom It May Concern:         This is confirmation that Karrie PANCHO Yousif attended her scheduled appointment with ALDA Bishop on 4/10/24. Please excuse through Friday 4/12/24. Thank you for making accommodations for rest and recovery.          If you have any questions please do not hesitate to call me at the phone number listed below.    Sincerely,          MO BishopRSINDI.  [phone]

## 2024-04-10 NOTE — PROGRESS NOTES
CHIEF COMPLAINT  Chief Complaint   Patient presents with    Cough     SOB, scratchy throat, nasal congestion, x 4 days      Subjective:   Karrie Yousif is a 23 y.o. female who presents to urgent care with complaints of cough, shortness of breath, scratchy throat and nasal congestion x 4 days.  Patient denies any symptoms of fever or chills.  She denies any symptoms of nausea, vomiting or diarrhea.  She does report a decrease in appetite but is taking fluids.  Patient reports the use of TheraFlu and NyQuil for alleviation of symptoms and discomfort.  She does report a pertinent past medical history of pneumonia.  She denies any other pertinent past medical history.        Review of Systems   Constitutional:  Negative for chills and fever.   HENT:  Positive for congestion and sore throat.    Respiratory:  Positive for cough.    Gastrointestinal:  Negative for diarrhea and vomiting.       PAST MEDICAL HISTORY  Patient Active Problem List    Diagnosis Date Noted    Sprain of anterior cruciate ligament of right knee 02/21/2023       SURGICAL HISTORY   has a past surgical history that includes knee scope,aid ant cruciate repair (Right, 7/17/2023).    ALLERGIES  No Known Allergies    CURRENT MEDICATIONS  Home Medications       Reviewed by Tao Huynh't (Medical Assistant) on 04/10/24 at 0823  Med List Status: <None>     Medication Last Dose Status   fluticasone (FLONASE) 50 MCG/ACT nasal spray  Active   lidocaine (XYLOCAINE) 2 % Solution  Active                    SOCIAL HISTORY  Social History     Tobacco Use    Smoking status: Never    Smokeless tobacco: Never   Vaping Use    Vaping Use: Never used   Substance and Sexual Activity    Alcohol use: Yes     Comment: occasional    Drug use: No    Sexual activity: Yes     Partners: Male     Birth control/protection: Condom       FAMILY HISTORY  History reviewed. No pertinent family history.      Medications, Allergies, and current problem list reviewed today  "in Epic.     Objective:     /68   Pulse 66   Temp 36.7 °C (98.1 °F)   Resp 18   Ht 1.676 m (5' 6\")   Wt 71.7 kg (158 lb)   SpO2 97%     Physical Exam  Vitals reviewed.   Constitutional:       General: She is not in acute distress.     Appearance: Normal appearance. She is not ill-appearing or toxic-appearing.   HENT:      Head: Normocephalic.      Nose: Congestion present.      Mouth/Throat:      Mouth: Mucous membranes are moist.      Pharynx: Oropharynx is clear. Posterior oropharyngeal erythema present. No oropharyngeal exudate.   Cardiovascular:      Rate and Rhythm: Normal rate and regular rhythm.      Pulses: Normal pulses.      Heart sounds: Normal heart sounds.   Pulmonary:      Effort: Pulmonary effort is normal. No respiratory distress.      Breath sounds: Normal breath sounds. No stridor. No wheezing, rhonchi or rales.   Musculoskeletal:      Cervical back: Normal range of motion. No rigidity or tenderness.   Lymphadenopathy:      Cervical: No cervical adenopathy.   Skin:     General: Skin is warm.      Capillary Refill: Capillary refill takes less than 2 seconds.   Neurological:      General: No focal deficit present.      Mental Status: She is alert.   Psychiatric:         Mood and Affect: Mood normal.         Assessment/Plan:     Diagnosis and associated orders:     1. Pharyngitis, unspecified etiology  POCT GROUP A STREP, PCR      2. Nonspecific syndrome suggestive of viral illness  POCT CoV-2, Flu A/B, RSV by PCR         Comments/MDM:     Upon physical exam patient is in apparent signs distress.  Mild congestion appreciated.  Mucous membranes are moist and clear.  Mild pharyngeal erythema.  Neck is supple, no lymphadenopathy.  She is clear to auscultation.  Normal respiratory effort.  Vital signs are stable.  Counseled patient on potential viral etiology of symptoms.  Discussed use of OTC medications for alleviation of symptoms and discomfort.  Advised on adequate hydration and " rest.  Point-of-care strep test completed  Point-of-care viral swab completed  Red flag signs and symptoms discussed.  Instructed to return to ER or urgent care if symptoms worsen or fail to improve.         Differential diagnosis, natural history, supportive care, and indications for immediate follow-up discussed.    Advised the patient to follow-up with the primary care physician for recheck, reevaluation, and consideration of further management.    Please note that this dictation was created using voice recognition software. I have made a reasonable attempt to correct obvious errors, but I expect that there are errors of grammar and possibly content that I did not discover before finalizing the note.    This note was electronically signed by ALDA Bishop

## 2025-03-04 ENCOUNTER — OFFICE VISIT (OUTPATIENT)
Dept: URGENT CARE | Facility: CLINIC | Age: 25
End: 2025-03-04
Payer: OTHER GOVERNMENT

## 2025-03-04 VITALS
RESPIRATION RATE: 14 BRPM | HEART RATE: 63 BPM | WEIGHT: 169.6 LBS | SYSTOLIC BLOOD PRESSURE: 112 MMHG | HEIGHT: 66 IN | DIASTOLIC BLOOD PRESSURE: 70 MMHG | OXYGEN SATURATION: 98 % | TEMPERATURE: 97.9 F | BODY MASS INDEX: 27.26 KG/M2

## 2025-03-04 DIAGNOSIS — R05.1 ACUTE COUGH: ICD-10-CM

## 2025-03-04 DIAGNOSIS — J30.2 SEASONAL ALLERGIC RHINITIS, UNSPECIFIED TRIGGER: ICD-10-CM

## 2025-03-04 PROCEDURE — 99213 OFFICE O/P EST LOW 20 MIN: CPT

## 2025-03-04 PROCEDURE — 3078F DIAST BP <80 MM HG: CPT

## 2025-03-04 PROCEDURE — 3074F SYST BP LT 130 MM HG: CPT

## 2025-03-04 RX ORDER — DEXTROMETHORPHAN HYDROBROMIDE AND PROMETHAZINE HYDROCHLORIDE 15; 6.25 MG/5ML; MG/5ML
5 SYRUP ORAL NIGHTLY PRN
Qty: 118 ML | Refills: 0 | Status: SHIPPED | OUTPATIENT
Start: 2025-03-04

## 2025-03-04 RX ORDER — ALBUTEROL SULFATE 90 UG/1
2 INHALANT RESPIRATORY (INHALATION) EVERY 6 HOURS PRN
Qty: 8.5 G | Refills: 0 | Status: SHIPPED | OUTPATIENT
Start: 2025-03-04

## 2025-03-04 RX ORDER — FLUTICASONE PROPIONATE 50 MCG
1 SPRAY, SUSPENSION (ML) NASAL DAILY
Qty: 16 G | Refills: 0 | Status: SHIPPED | OUTPATIENT
Start: 2025-03-04

## 2025-03-04 ASSESSMENT — ENCOUNTER SYMPTOMS
DIZZINESS: 0
SORE THROAT: 0
FEVER: 0
BLURRED VISION: 0
PALPITATIONS: 0
HEMOPTYSIS: 0
WEAKNESS: 0
VOMITING: 0
TINGLING: 0
NAUSEA: 0
HEADACHES: 0
SHORTNESS OF BREATH: 0
MYALGIAS: 0
COUGH: 1
SPUTUM PRODUCTION: 1
DOUBLE VISION: 0
DIARRHEA: 0
WHEEZING: 0
CHILLS: 0

## 2025-03-04 NOTE — LETTER
March 4, 2025         Patient: Karrie Yousif   YOB: 2000   Date of Visit: 3/4/2025           To Whom it May Concern:    Karrie Yousif was seen in my clinic on 3/4/2025. Please excuse any days she may have missed. She may return to work on 3/6/25 or sooner if she feels better.    If you have any questions or concerns, please don't hesitate to call.        Sincerely,           NORAH Rayo.  Electronically Signed

## 2025-03-05 NOTE — PROGRESS NOTES
Subjective:   Karrie Yousif is a 24 y.o. female who presents for Nasal Congestion (Xmore than 1week. Runny nose, cough, itchy throat, chest pain from coughing)    Patient presents to the clinic for complaints of sinus congestion w/o sinus pressure, chest congestion, cough x 9 days.  Cough has been moist with chest congestion, productive w/ mucus, and coughing fits.   Patient was previously sick at the beginning of her cough with flulike symptoms, including body aches.  All other symptoms have resolved, but cough persists.  Has been sneezing a lot w/ the cough and sinus congestion/runny nose.  Has taken Nyquil and tylenol.   Patient denies chest pain, SOB, dizziness/lightheadedness/vertigo, nausea/vomiting/diarrhea, palpitations or racing heart rate, fever, chills, weakness, or fatigue.    HPI    Review of Systems   Constitutional:  Negative for chills and fever.   HENT:  Positive for congestion. Negative for ear pain and sore throat.    Eyes:  Negative for blurred vision and double vision.   Respiratory:  Positive for cough and sputum production. Negative for hemoptysis, shortness of breath and wheezing.    Cardiovascular:  Negative for chest pain and palpitations.   Gastrointestinal:  Negative for diarrhea, nausea and vomiting.   Genitourinary:  Negative for dysuria.   Musculoskeletal:  Negative for myalgias.   Neurological:  Negative for dizziness, tingling, weakness and headaches.   All other systems reviewed and are negative.    Refer to HPI for additional details.    During this visit, appropriate PPE was worn, and hand hygiene was performed.    PMH:  has no past medical history on file.    MEDS:   Current Outpatient Medications:     sertraline (ZOLOFT) 50 MG Tab, Take 50 mg by mouth every day., Disp: , Rfl:     albuterol 108 (90 Base) MCG/ACT Aero Soln inhalation aerosol, Inhale 2 Puffs every 6 hours as needed for Shortness of Breath., Disp: 8.5 g, Rfl: 0    fluticasone (FLONASE) 50 MCG/ACT nasal spray,  "Administer 1 Spray into affected nostril(S) every day., Disp: 16 g, Rfl: 0    promethazine-dextromethorphan (PROMETHAZINE-DM) 6.25-15 MG/5ML syrup, Take 5 mL by mouth at bedtime as needed for Cough. (Note: may cause drowsiness, do not drive or operate heavy machinery while taking), Disp: 118 mL, Rfl: 0    lidocaine (XYLOCAINE) 2 % Solution, Take 15 mL by mouth as needed for Throat/Mouth Pain., Disp: 100 mL, Rfl: 0    ALLERGIES: No Known Allergies  SURGHX:   Past Surgical History:   Procedure Laterality Date    PB KNEE SCOPE,AID ANT CRUCIATE REPAIR Right 7/17/2023    Procedure: RIGHT KNEE ARTHROSCOPY ANTERIOR CRUCIATE LIGAMENT RECONSTRUCTION WITH HAMSTRING AUTOGRAFT, REPAIRS AS INDICATED;  Surgeon: Hussein Ferreira M.D.;  Location: [facility];  Service: Orthopedics     SOCHX:  reports that she has never smoked. She has never used smokeless tobacco. She reports that she does not currently use alcohol. She reports that she does not use drugs.    FH: Per HPI as applicable/pertinent.    Medications, Allergies, and current problem list reviewed today in Epic.     Objective:     /70   Pulse 63   Temp 36.6 °C (97.9 °F)   Resp 14   Ht 1.676 m (5' 6\")   Wt 76.9 kg (169 lb 9.6 oz)   SpO2 98%     Physical Exam  Constitutional:       Appearance: Normal appearance. She is not ill-appearing or toxic-appearing.   HENT:      Head: Normocephalic.      Right Ear: Tympanic membrane, ear canal and external ear normal.      Left Ear: Tympanic membrane, ear canal and external ear normal.      Nose: Rhinorrhea present. No congestion.      Mouth/Throat:      Mouth: Mucous membranes are moist.      Pharynx: Oropharynx is clear. No oropharyngeal exudate or posterior oropharyngeal erythema.   Eyes:      General:         Right eye: No discharge.         Left eye: No discharge.      Extraocular Movements: Extraocular movements intact.      Conjunctiva/sclera: Conjunctivae normal.      Pupils: Pupils are equal, " round, and reactive to light.   Cardiovascular:      Rate and Rhythm: Normal rate and regular rhythm.      Pulses: Normal pulses.      Heart sounds: Normal heart sounds. No murmur heard.  Pulmonary:      Effort: Pulmonary effort is normal. No respiratory distress.      Breath sounds: No wheezing, rhonchi or rales.      Comments: Moist cough and coughing fits in clinic.  Abdominal:      General: Abdomen is flat.   Musculoskeletal:         General: No signs of injury. Normal range of motion.      Cervical back: Normal range of motion. No rigidity.   Lymphadenopathy:      Cervical: No cervical adenopathy.   Skin:     General: Skin is warm and dry.   Neurological:      General: No focal deficit present.      Mental Status: She is alert and oriented to person, place, and time.      Motor: No weakness.         Assessment/Plan:     Diagnosis and associated orders:     1. Seasonal allergic rhinitis, unspecified trigger  - fluticasone (FLONASE) 50 MCG/ACT nasal spray; Administer 1 Spray into affected nostril(S) every day.  Dispense: 16 g; Refill: 0    2. Acute cough  - albuterol 108 (90 Base) MCG/ACT Aero Soln inhalation aerosol; Inhale 2 Puffs every 6 hours as needed for Shortness of Breath.  Dispense: 8.5 g; Refill: 0  - promethazine-dextromethorphan (PROMETHAZINE-DM) 6.25-15 MG/5ML syrup; Take 5 mL by mouth at bedtime as needed for Cough. (Note: may cause drowsiness, do not drive or operate heavy machinery while taking)  Dispense: 118 mL; Refill: 0    Other orders  - sertraline (ZOLOFT) 50 MG Tab; Take 50 mg by mouth every day.     Comments/MDM:     Discussed that likely etiology of the patient's symptoms is allergic rhinitis and acute cough secondary to previous URI.  Albuterol as needed and promethazine DM as needed as well as Flonase daily prescribed for the patient. Discussed proper administration and dosing as well as associated adverse/side effects of prescribed medications.  Advised patient to continue OTC  pharmacologic and nonpharmacologic treatment of her symptoms, including but not limited to Tylenol, Motrin, OTC cough and cold medications, OTC allergy medicine, antihistamines, and plenty of rest and fluids.  Patient agreeable to this plan of care.  All questions answered.  Return to clinic if symptoms persist or worsen.  Red flag symptoms warranting emergency medical services discussed, including but not limited to chest pain, SOB, wheezing, difficulty breathing or swallowing, sensation of throat closing or mass in the throat, severe intractable headache, changes to vision or hearing, palpitations or racing heart rate, abdominal pain, fever greater than 103F despite medication management, dizziness/lightheadedness/vertigo, or nausea/vomiting/diarrhea.           Differential diagnosis, natural history, supportive care, and indications for immediate follow-up discussed.    Advised the patient to follow-up with the primary care physician for recheck, reevaluation, and consideration of further management.    Instructed patient to seek emergency medical attention via calling EMS or going to the Emergency Room for red flag symptoms, including but not limited to: chest pain, palpitations, fever greater than 103F, shortness of breath, wheezing, new or worsened numbness/tingling, focal or unilateral weakness, and bloody vomit/stool.     Please note that this dictation was created using voice recognition software. I have made a reasonable attempt to correct obvious errors, but I expect that there are errors of grammar and possibly content that I did not discover before finalizing the note.    This note was electronically signed by ALDA Rayo

## 2025-05-02 ENCOUNTER — OFFICE VISIT (OUTPATIENT)
Dept: URGENT CARE | Facility: PHYSICIAN GROUP | Age: 25
End: 2025-05-02
Payer: OTHER GOVERNMENT

## 2025-05-02 ENCOUNTER — HOSPITAL ENCOUNTER (OUTPATIENT)
Facility: MEDICAL CENTER | Age: 25
End: 2025-05-02
Attending: PHYSICIAN ASSISTANT
Payer: OTHER GOVERNMENT

## 2025-05-02 VITALS
SYSTOLIC BLOOD PRESSURE: 100 MMHG | HEART RATE: 61 BPM | WEIGHT: 170 LBS | TEMPERATURE: 97.9 F | RESPIRATION RATE: 18 BRPM | DIASTOLIC BLOOD PRESSURE: 70 MMHG | BODY MASS INDEX: 27.32 KG/M2 | OXYGEN SATURATION: 98 % | HEIGHT: 66 IN

## 2025-05-02 DIAGNOSIS — N92.6 IRREGULAR MENSES: ICD-10-CM

## 2025-05-02 DIAGNOSIS — N76.0 ACUTE VAGINITIS: ICD-10-CM

## 2025-05-02 DIAGNOSIS — R39.9 UTI SYMPTOMS: ICD-10-CM

## 2025-05-02 LAB
APPEARANCE UR: CLEAR
BILIRUB UR STRIP-MCNC: NEGATIVE MG/DL
COLOR UR AUTO: YELLOW
GLUCOSE UR STRIP.AUTO-MCNC: NEGATIVE MG/DL
KETONES UR STRIP.AUTO-MCNC: NEGATIVE MG/DL
LEUKOCYTE ESTERASE UR QL STRIP.AUTO: NEGATIVE
NITRITE UR QL STRIP.AUTO: NEGATIVE
PH UR STRIP.AUTO: 7 [PH] (ref 5–8)
POCT INT CON NEG: NEGATIVE
POCT INT CON POS: POSITIVE
POCT URINE PREGNANCY TEST: NEGATIVE
PROT UR QL STRIP: NEGATIVE MG/DL
RBC UR QL AUTO: NEGATIVE
SP GR UR STRIP.AUTO: 1.01
UROBILINOGEN UR STRIP-MCNC: NORMAL MG/DL

## 2025-05-02 PROCEDURE — 87480 CANDIDA DNA DIR PROBE: CPT

## 2025-05-02 PROCEDURE — 3078F DIAST BP <80 MM HG: CPT | Performed by: PHYSICIAN ASSISTANT

## 2025-05-02 PROCEDURE — 87660 TRICHOMONAS VAGIN DIR PROBE: CPT

## 2025-05-02 PROCEDURE — 87510 GARDNER VAG DNA DIR PROBE: CPT

## 2025-05-02 PROCEDURE — 81002 URINALYSIS NONAUTO W/O SCOPE: CPT | Performed by: PHYSICIAN ASSISTANT

## 2025-05-02 PROCEDURE — 81025 URINE PREGNANCY TEST: CPT | Performed by: PHYSICIAN ASSISTANT

## 2025-05-02 PROCEDURE — 3074F SYST BP LT 130 MM HG: CPT | Performed by: PHYSICIAN ASSISTANT

## 2025-05-02 PROCEDURE — 87591 N.GONORRHOEAE DNA AMP PROB: CPT

## 2025-05-02 PROCEDURE — 99214 OFFICE O/P EST MOD 30 MIN: CPT | Performed by: PHYSICIAN ASSISTANT

## 2025-05-02 PROCEDURE — 87491 CHLMYD TRACH DNA AMP PROBE: CPT

## 2025-05-02 ASSESSMENT — ENCOUNTER SYMPTOMS
ABDOMINAL PAIN: 0
FEVER: 0
CARDIOVASCULAR NEGATIVE: 1
VOMITING: 0
NAUSEA: 0
DIARRHEA: 0
GASTROINTESTINAL NEGATIVE: 1
CONSTITUTIONAL NEGATIVE: 1
VAGINITIS: 1
NEUROLOGICAL NEGATIVE: 1
FLANK PAIN: 0
RESPIRATORY NEGATIVE: 1

## 2025-05-03 DIAGNOSIS — N76.0 ACUTE VAGINITIS: ICD-10-CM

## 2025-05-03 LAB
CANDIDA DNA VAG QL PROBE+SIG AMP: NEGATIVE
G VAGINALIS DNA VAG QL PROBE+SIG AMP: POSITIVE
T VAGINALIS DNA VAG QL PROBE+SIG AMP: NEGATIVE

## 2025-05-03 NOTE — PROGRESS NOTES
Subjective     Karrie Yousif is a very pleasant 24 y.o. female who presents with Exposure to STD (Yearly test to make sure p/t does not have std/sti p/t normally gets tested through  but wants to get tested sooner )            Vaginitis  The patient's primary symptoms include vaginal bleeding and vaginal discharge. The patient's pertinent negatives include no genital itching, genital lesions, genital odor, genital rash or missed menses. This is a new problem. The current episode started today. Pertinent negatives include no abdominal pain, diarrhea, dysuria, fever, flank pain, frequency, hematuria, nausea, painful intercourse, urgency or vomiting. The vaginal discharge was normal. There has been no bleeding. Nothing aggravates the symptoms. She has tried nothing for the symptoms. The treatment provided no relief. She is sexually active. No, her partner does not have an STD. She uses nothing for contraception. Her menstrual history has been irregular (Menorrhagia). There is no history of an STD or vaginosis.       PMH:  has no past medical history on file.  MEDS:   Current Outpatient Medications:     sertraline (ZOLOFT) 50 MG Tab, Take 50 mg by mouth every day., Disp: , Rfl:   ALLERGIES:   Allergies   Allergen Reactions    Cat Hair Extract Unspecified     Allegic to cats      SURGHX:   Past Surgical History:   Procedure Laterality Date    PB KNEE SCOPE,AID ANT CRUCIATE REPAIR Right 7/17/2023    Procedure: RIGHT KNEE ARTHROSCOPY ANTERIOR CRUCIATE LIGAMENT RECONSTRUCTION WITH HAMSTRING AUTOGRAFT, REPAIRS AS INDICATED;  Surgeon: Hussein Ferreira M.D.;  Location: [facility];  Service: Orthopedics     SOCHX:  reports that she has never smoked. She has never used smokeless tobacco. She reports that she does not currently use alcohol. She reports that she does not use drugs.  FH: family history is not on file.      Review of Systems   Constitutional: Negative.  Negative for fever.  "  Respiratory: Negative.     Cardiovascular: Negative.    Gastrointestinal: Negative.  Negative for abdominal pain, diarrhea, nausea and vomiting.   Genitourinary:  Positive for vaginal discharge. Negative for dysuria, flank pain, frequency, hematuria, missed menses and urgency.   Neurological: Negative.        Medications, Allergies, and current problem list reviewed today in Epic           Objective     /70 (BP Location: Right arm, Patient Position: Sitting, BP Cuff Size: Adult)   Pulse 61   Temp 36.6 °C (97.9 °F) (Temporal)   Resp 18   Ht 1.676 m (5' 6\")   Wt 77.1 kg (170 lb)   SpO2 98%   BMI 27.44 kg/m²      Physical Exam  Vitals and nursing note reviewed.   Constitutional:       General: She is not in acute distress.     Appearance: Normal appearance. She is well-developed. She is not ill-appearing, toxic-appearing or diaphoretic.   HENT:      Head: Normocephalic and atraumatic.      Right Ear: External ear normal.      Left Ear: External ear normal.      Nose: Nose normal.   Eyes:      Conjunctiva/sclera: Conjunctivae normal.   Cardiovascular:      Rate and Rhythm: Normal rate and regular rhythm.      Heart sounds: Normal heart sounds.   Pulmonary:      Effort: Pulmonary effort is normal. No respiratory distress.      Breath sounds: Normal breath sounds. No wheezing, rhonchi or rales.   Abdominal:      General: Abdomen is flat. There is no distension.      Palpations: Abdomen is soft.      Tenderness: There is no abdominal tenderness. There is no right CVA tenderness, left CVA tenderness, guarding or rebound.      Comments: No suprapubic tenderness   Musculoskeletal:      Cervical back: Normal range of motion and neck supple.   Skin:     General: Skin is warm and dry.   Neurological:      General: No focal deficit present.      Mental Status: She is alert and oriented to person, place, and time. Mental status is at baseline.   Psychiatric:         Mood and Affect: Mood normal.         Behavior: " Behavior normal.         Thought Content: Thought content normal.         Judgment: Judgment normal.                                  Assessment & Plan  UTI symptoms  Urinalysis: Negative  Pregnancy: Negative    Orders:    POCT Urinalysis    POCT PREGNANCY    Acute vaginitis  Slight vaginal discharge of the patient believes it may be normal.  No itching, irritation, dysuria, rash or lesions.  No known exposure to STD.  Vitals and exam normal.  Vaginal swabs collected via self swab.  Patient declines blood testing at this time.      Orders:    Chlamydia/GC, PCR (Genital/Anal swab); Future    VAGINAL PATHOGENS DNA PANEL; Future    Referral to Gynecology    POCT PREGNANCY    Irregular menses  Menorrhagia for the last 6 months.  Patient notes symptoms started around the same time she began taking sertraline.  Pelvic pain during menses however currently asymptomatic.  Patient is not on birth control.  Referral to gynecology has been placed for further discussion.  Patient has never had a Pap smear.      Orders:    Referral to Gynecology    POCT PREGNANCY           I personally reviewed prior external notes and test results pertinent to today's visit. Return to clinic or go to ED if symptoms worsen or persist. Red flag symptoms and indications for ED discussed at length. Patient/Parent/Guardian voices understanding.  AVS with post-visit instructions printed and provided or given verbally.  Follow-up with your primary care provider in 3-5 days. All side effects and potential interactions of prescribed medication discussed including allergic response, GI upset, tendon injury, rash, sedation, OCP effectiveness, etc.    Please note that this dictation was created using voice recognition software. I have made every reasonable attempt to correct obvious errors, but I expect that there are errors of grammar and possibly content that I did not discover before finalizing the note.

## 2025-05-04 ENCOUNTER — RESULTS FOLLOW-UP (OUTPATIENT)
Dept: URGENT CARE | Facility: PHYSICIAN GROUP | Age: 25
End: 2025-05-04

## 2025-05-04 DIAGNOSIS — N76.0 BV (BACTERIAL VAGINOSIS): ICD-10-CM

## 2025-05-04 DIAGNOSIS — B96.89 BV (BACTERIAL VAGINOSIS): ICD-10-CM

## 2025-05-04 LAB
C TRACH DNA GENITAL QL NAA+PROBE: NEGATIVE
N GONORRHOEA DNA GENITAL QL NAA+PROBE: NEGATIVE
SPECIMEN SOURCE: NORMAL

## 2025-05-04 RX ORDER — METRONIDAZOLE 500 MG/1
500 TABLET ORAL 2 TIMES DAILY
Qty: 14 TABLET | Refills: 0 | Status: SHIPPED | OUTPATIENT
Start: 2025-05-04 | End: 2025-05-11

## 2025-05-09 NOTE — Clinical Note
REFERRAL APPROVAL NOTICE         Sent on May 9, 2025                   Karrie Edith Benja  [address] [address]                   Dear Ms. Benja,    After a careful review of the medical information and benefit coverage, [facility] has processed your referral. See below for additional details.    If applicable, you must be actively enrolled with your insurance for coverage of the authorized service. If you have any questions regarding your coverage, please contact your insurance directly.    REFERRAL INFORMATION   Referral #:  [id number]  Referred-To Department    Referred-By Provider:  Gynecology    Jacky Mac P.A.-C.   [facility]      [address]  [address]  [facility] [address] [phone] [address], [address]  [facility] [address] [phone]    Referral Start Date:  05/02/2025  Referral End Date:   09/30/2025             SCHEDULING  If you do not already have an appointment, please call [phone] to make an appointment.     MORE INFORMATION  If you do not already have a [facility] account, sign up at: [email or web address]  You can access your medical information, make appointments, see lab results, billing information, and more.  If you have questions regarding this referral, please contact  the [facility] Referrals department at:             [phone]. Monday - Friday 8:00AM - 5:00PM.     Sincerely,    [facility]

## 2025-06-10 ENCOUNTER — GYNECOLOGY VISIT (OUTPATIENT)
Dept: OBGYN | Facility: CLINIC | Age: 25
End: 2025-06-10
Attending: PHYSICIAN ASSISTANT
Payer: OTHER GOVERNMENT

## 2025-06-10 ENCOUNTER — HOSPITAL ENCOUNTER (OUTPATIENT)
Facility: MEDICAL CENTER | Age: 25
End: 2025-06-10
Attending: STUDENT IN AN ORGANIZED HEALTH CARE EDUCATION/TRAINING PROGRAM
Payer: OTHER GOVERNMENT

## 2025-06-10 VITALS
SYSTOLIC BLOOD PRESSURE: 91 MMHG | DIASTOLIC BLOOD PRESSURE: 59 MMHG | BODY MASS INDEX: 27.16 KG/M2 | HEIGHT: 66 IN | WEIGHT: 169 LBS

## 2025-06-10 DIAGNOSIS — Z12.4 SCREENING FOR MALIGNANT NEOPLASM OF CERVIX: ICD-10-CM

## 2025-06-10 DIAGNOSIS — Z01.419 WOMEN'S ANNUAL ROUTINE GYNECOLOGICAL EXAMINATION: Primary | ICD-10-CM

## 2025-06-10 PROCEDURE — 99385 PREV VISIT NEW AGE 18-39: CPT | Performed by: STUDENT IN AN ORGANIZED HEALTH CARE EDUCATION/TRAINING PROGRAM

## 2025-06-10 PROCEDURE — 3074F SYST BP LT 130 MM HG: CPT | Performed by: STUDENT IN AN ORGANIZED HEALTH CARE EDUCATION/TRAINING PROGRAM

## 2025-06-10 PROCEDURE — 87491 CHLMYD TRACH DNA AMP PROBE: CPT

## 2025-06-10 PROCEDURE — 3078F DIAST BP <80 MM HG: CPT | Performed by: STUDENT IN AN ORGANIZED HEALTH CARE EDUCATION/TRAINING PROGRAM

## 2025-06-10 PROCEDURE — 99459 PELVIC EXAMINATION: CPT | Performed by: STUDENT IN AN ORGANIZED HEALTH CARE EDUCATION/TRAINING PROGRAM

## 2025-06-10 PROCEDURE — 88142 CYTOPATH C/V THIN LAYER: CPT

## 2025-06-10 PROCEDURE — 87591 N.GONORRHOEAE DNA AMP PROB: CPT

## 2025-06-10 NOTE — PROGRESS NOTES
ANNUAL GYNECOLOGY VISIT    Chief Complaint  New Patient      Subjective  Karrie Yousif is a 24 y.o. female  Patient's last menstrual period was 2025. Currently is not currently taking anything for contraception. She currently is not interested in anything for contraception. She presents today for her Annual Exam.          Occupation: In the       Preventive Care   Immunization History   Administered Date(s) Administered    Dtap Vaccine 2000, 2001, 2001, 2002, 2005    HPV Quadrivalent Vaccine (GARDASIL) - HISTORICAL DATA 2012, 2013    Hepatitis A Vaccine, Ped/Adol 2003, 2005    Hepatitis B Vaccine Adolescent/Pediatric 2000, 2001, 2001    Hib Vaccine (Prp-d) - HISTORICAL DATA 2000, 2001, 2001, 2002    IPV 2000, 2001, 2002, 2005    Influenza Seasonal Injectable - Historical Data 2012    Influenza Vaccine Quad Inj (Pf) 2019, 2021    MENING VAC SERO B 2 DOSE SCHED IM (BEXSERO) 2017, 2019    MMR Vaccine 10/31/2001, 2005    MODERNA SARS-COV-2 VACCINE (12+) 2021    Meningococcal Conjugate Vaccine MCV4 (Menactra) 2012, 2017    Pneumococcal Vaccine (PCV7) - HISTORICAL DATA 2001, 2001, 2001    QUANTIFERON GOLD - HISTORICAL DATA 2021    Tdap Vaccine 2012, 2021    Varicella Vaccine Live 10/31/2001, 03/15/2011       Guardasil HPV vaccine: UTD    Gynecology History and ROS  Currently in a relationship: yes - with one male partner  Feel safe in your current relationship: yes  Current Sexual Activity: yes   History of sexually transmitted diseases? no  Abnormal discharge? no  Current Contraception:  none    Menstrual History  Patient's last menstrual period was 2025.  Periods are regular  q 28 days, lasting 4-5 days.   Clots or heavy flow: yes - the first and second day  Dysmenorrhea: yes -  "first and second day has back cramping  Intermenstrual bleeding/spotting: no  Significant pain with periods:no  Bothersome PMS symptoms: no  Significant Pelvic Pain: no    Pap History  Last pap smear: n/  History of moderate or severe dysplasia: n/a    Cancer Risk Assessement:  Family history of:   - Breast cancer: maternal aunt   - Ovarian cancer: no   - Uterine cancer: no   - Colon cancer: no    Obstetric History  OB History    Para Term  AB Living   0 0 0 0 0 0   SAB IAB Ectopic Molar Multiple Live Births   0 0 0 0 0 0       Past Medical History  Past Medical History[1]    Past Surgical History  Past Surgical History[2]    Social History  Social History[3]     Family History  Family History   Problem Relation Age of Onset    Throat Cancer Mother     Breast Cancer Maternal Aunt     Diabetes Maternal Aunt        Home Medications  Current Outpatient Medications   Medication Sig    sertraline (ZOLOFT) 50 MG Tab Take 50 mg by mouth every day.       Allergies/Reactions  Allergies[4]    ROS  Positive ROS: see HPI  Gen: no fevers or chills, no significant weight loss or gain, excessive fatigue  Respiratory:  no cough or dyspnea  Cardiac:  no chest pain, no palpitations, no syncope  Breast: no breast discharge, pain, lump or skin changes  GI:  no heartburn, no abdominal pain, no nausea or vomiting  Urinary: no dysuria, urgency, frequency, incontinence   Psych: no depression or anxiety  Neuro: no migraines with aura, fainting spells, numbness or tingling  Extremities: no joint pain, persistently swollen ankles, recurrent leg cramps      Physical Examination:  Vital Signs: BP 91/59   Ht 5' 6\"   Wt 169 lb   LMP 2025   BMI 27.28 kg/m²       Constitutional: The patient is well developed and well nourished.  Psychiatric: Patient is oriented to time place and person.   Skin: No rash observed.  Neck: Appears symmetric. Thyroid normal size  Respiratory: normal effort  Breast: Inspection reveals no asymetry " or nipple discharge, no skin thickening, dimpling or erythema.  Palpation demonstrates no masses.  Abdomen: Soft, non-tender.  Pelvic Exam:      Vulva: external female genitalia are normal in appearance. No lesions     Urethra - no lesions, no erythema     Vagina: moist, pink, normal ruggae. Small amount of white discharge noted.     Cervix: pink, smooth, no lesions, no CMT     Uterus - non-tender, normal size, shape, contour, mobile, anteverted     Ovaries: non-tender, no appreciable masses    Pap Smear performed: Yes    Chaperone Present: diego Mccarty MA  Extremeties: Legs are symmetric and without tenderness. There is no edema present.        Assessment & Plan  Karrie Yousif is a 24 y.o. female who presents today for Annual Gyn Exam.     Assessment & Plan  Women's annual routine gynecological examination  Anticipatory guidance given. Encouraged adequate water intake, healthy diet, regular exercise. Educated on Pap smear screening and guidelines for age per ACOG and ASSCP. Discussed safe sex, STI prevention, contraception/family planning. Self breast exam taught.   Orders:    THINPREP RFLX HPV ASCUS W/CTNG    Screening for malignant neoplasm of cervix  Pap collected with MA at bedside  Orders:    THINPREP RFLX HPV ASCUS W/CTNG            Return: Annually or PRN    ECTOR HayesAMARIE             [1]   Past Medical History:  Diagnosis Date    Migraine    [2]   Past Surgical History:  Procedure Laterality Date    PB KNEE SCOPE,AID ANT CRUCIATE REPAIR Right 7/17/2023    Procedure: RIGHT KNEE ARTHROSCOPY ANTERIOR CRUCIATE LIGAMENT RECONSTRUCTION WITH HAMSTRING AUTOGRAFT, REPAIRS AS INDICATED;  Surgeon: Hussein Ferreira M.D.;  Location: [facility];  Service: Orthopedics   [3]   Social History  Tobacco Use    Smoking status: Never    Smokeless tobacco: Never   Vaping Use    Vaping status: Never Used   Substance Use Topics    Alcohol use: Not Currently     Comment: occasional     Drug use: No   [4]   Allergies  Allergen Reactions    Cat Hair Extract Unspecified     Allegic to cats

## 2025-06-12 ENCOUNTER — RESULTS FOLLOW-UP (OUTPATIENT)
Dept: SURGERY | Facility: MEDICAL CENTER | Age: 25
End: 2025-06-12
Payer: OTHER GOVERNMENT

## 2025-06-12 DIAGNOSIS — B37.9 YEAST INFECTION: Primary | ICD-10-CM

## 2025-06-16 ENCOUNTER — APPOINTMENT (OUTPATIENT)
Dept: RADIOLOGY | Facility: IMAGING CENTER | Age: 25
End: 2025-06-16
Attending: PHYSICIAN ASSISTANT
Payer: OTHER GOVERNMENT

## 2025-06-16 ENCOUNTER — HOSPITAL ENCOUNTER (EMERGENCY)
Facility: MEDICAL CENTER | Age: 25
End: 2025-06-16
Attending: EMERGENCY MEDICINE
Payer: OTHER GOVERNMENT

## 2025-06-16 ENCOUNTER — OFFICE VISIT (OUTPATIENT)
Dept: URGENT CARE | Facility: CLINIC | Age: 25
End: 2025-06-16
Payer: OTHER GOVERNMENT

## 2025-06-16 VITALS
HEIGHT: 66 IN | WEIGHT: 169 LBS | DIASTOLIC BLOOD PRESSURE: 60 MMHG | SYSTOLIC BLOOD PRESSURE: 90 MMHG | TEMPERATURE: 97.4 F | RESPIRATION RATE: 14 BRPM | OXYGEN SATURATION: 95 % | HEART RATE: 51 BPM | BODY MASS INDEX: 27.16 KG/M2

## 2025-06-16 VITALS
DIASTOLIC BLOOD PRESSURE: 75 MMHG | RESPIRATION RATE: 19 BRPM | SYSTOLIC BLOOD PRESSURE: 108 MMHG | HEIGHT: 66 IN | WEIGHT: 169.09 LBS | BODY MASS INDEX: 27.18 KG/M2 | OXYGEN SATURATION: 97 % | HEART RATE: 50 BPM | TEMPERATURE: 97.9 F

## 2025-06-16 DIAGNOSIS — R05.1 ACUTE COUGH: ICD-10-CM

## 2025-06-16 DIAGNOSIS — R07.9 CHEST PAIN, UNSPECIFIED TYPE: Primary | ICD-10-CM

## 2025-06-16 DIAGNOSIS — R94.31 T WAVE INVERSION IN EKG: ICD-10-CM

## 2025-06-16 LAB
ALBUMIN SERPL BCP-MCNC: 4 G/DL (ref 3.2–4.9)
ALBUMIN/GLOB SERPL: 1.4 G/DL
ALP SERPL-CCNC: 68 U/L (ref 30–99)
ALT SERPL-CCNC: 39 U/L (ref 2–50)
ANION GAP SERPL CALC-SCNC: 11 MMOL/L (ref 7–16)
AST SERPL-CCNC: 38 U/L (ref 12–45)
BASOPHILS # BLD AUTO: 1.2 % (ref 0–1.8)
BASOPHILS # BLD: 0.05 K/UL (ref 0–0.12)
BILIRUB SERPL-MCNC: 0.3 MG/DL (ref 0.1–1.5)
BUN SERPL-MCNC: 11 MG/DL (ref 8–22)
CALCIUM ALBUM COR SERPL-MCNC: 9 MG/DL (ref 8.5–10.5)
CALCIUM SERPL-MCNC: 9 MG/DL (ref 8.5–10.5)
CHLORIDE SERPL-SCNC: 105 MMOL/L (ref 96–112)
CO2 SERPL-SCNC: 21 MMOL/L (ref 20–33)
CREAT SERPL-MCNC: 0.68 MG/DL (ref 0.5–1.4)
EKG IMPRESSION: NORMAL
EOSINOPHIL # BLD AUTO: 0.18 K/UL (ref 0–0.51)
EOSINOPHIL NFR BLD: 4.3 % (ref 0–6.9)
ERYTHROCYTE [DISTWIDTH] IN BLOOD BY AUTOMATED COUNT: 41 FL (ref 35.9–50)
FLUAV RNA SPEC QL NAA+PROBE: NEGATIVE
FLUBV RNA SPEC QL NAA+PROBE: NEGATIVE
GFR SERPLBLD CREATININE-BSD FMLA CKD-EPI: 124 ML/MIN/1.73 M 2
GLOBULIN SER CALC-MCNC: 2.8 G/DL (ref 1.9–3.5)
GLUCOSE SERPL-MCNC: 96 MG/DL (ref 65–99)
HCT VFR BLD AUTO: 39.8 % (ref 37–47)
HGB BLD-MCNC: 13.4 G/DL (ref 12–16)
IMM GRANULOCYTES # BLD AUTO: 0.01 K/UL (ref 0–0.11)
IMM GRANULOCYTES NFR BLD AUTO: 0.2 % (ref 0–0.9)
LYMPHOCYTES # BLD AUTO: 1.64 K/UL (ref 1–4.8)
LYMPHOCYTES NFR BLD: 39.6 % (ref 22–41)
MCH RBC QN AUTO: 29.8 PG (ref 27–33)
MCHC RBC AUTO-ENTMCNC: 33.7 G/DL (ref 32.2–35.5)
MCV RBC AUTO: 88.4 FL (ref 81.4–97.8)
MONOCYTES # BLD AUTO: 0.54 K/UL (ref 0–0.85)
MONOCYTES NFR BLD AUTO: 13 % (ref 0–13.4)
NEUTROPHILS # BLD AUTO: 1.72 K/UL (ref 1.82–7.42)
NEUTROPHILS NFR BLD: 41.7 % (ref 44–72)
NRBC # BLD AUTO: 0 K/UL
NRBC BLD-RTO: 0 /100 WBC (ref 0–0.2)
PLATELET # BLD AUTO: 256 K/UL (ref 164–446)
PMV BLD AUTO: 11.7 FL (ref 9–12.9)
POTASSIUM SERPL-SCNC: 3.9 MMOL/L (ref 3.6–5.5)
PROT SERPL-MCNC: 6.8 G/DL (ref 6–8.2)
RBC # BLD AUTO: 4.5 M/UL (ref 4.2–5.4)
RSV RNA SPEC QL NAA+PROBE: NEGATIVE
SARS-COV-2 RNA RESP QL NAA+PROBE: NEGATIVE
SODIUM SERPL-SCNC: 137 MMOL/L (ref 135–145)
TROPONIN T SERPL-MCNC: <6 NG/L (ref 6–19)
WBC # BLD AUTO: 4.1 K/UL (ref 4.8–10.8)

## 2025-06-16 PROCEDURE — 0241U POCT CEPHEID COV-2, FLU A/B, RSV - PCR: CPT | Performed by: PHYSICIAN ASSISTANT

## 2025-06-16 PROCEDURE — 84484 ASSAY OF TROPONIN QUANT: CPT

## 2025-06-16 PROCEDURE — 36415 COLL VENOUS BLD VENIPUNCTURE: CPT

## 2025-06-16 PROCEDURE — 71046 X-RAY EXAM CHEST 2 VIEWS: CPT | Mod: TC | Performed by: RADIOLOGY

## 2025-06-16 PROCEDURE — 99215 OFFICE O/P EST HI 40 MIN: CPT | Performed by: PHYSICIAN ASSISTANT

## 2025-06-16 PROCEDURE — A9270 NON-COVERED ITEM OR SERVICE: HCPCS | Performed by: EMERGENCY MEDICINE

## 2025-06-16 PROCEDURE — 85025 COMPLETE CBC W/AUTO DIFF WBC: CPT

## 2025-06-16 PROCEDURE — 93005 ELECTROCARDIOGRAM TRACING: CPT | Mod: TC | Performed by: EMERGENCY MEDICINE

## 2025-06-16 PROCEDURE — 3074F SYST BP LT 130 MM HG: CPT | Performed by: PHYSICIAN ASSISTANT

## 2025-06-16 PROCEDURE — 99283 EMERGENCY DEPT VISIT LOW MDM: CPT

## 2025-06-16 PROCEDURE — 93000 ELECTROCARDIOGRAM COMPLETE: CPT | Performed by: PHYSICIAN ASSISTANT

## 2025-06-16 PROCEDURE — 700102 HCHG RX REV CODE 250 W/ 637 OVERRIDE(OP): Performed by: EMERGENCY MEDICINE

## 2025-06-16 PROCEDURE — 80053 COMPREHEN METABOLIC PANEL: CPT

## 2025-06-16 PROCEDURE — 3078F DIAST BP <80 MM HG: CPT | Performed by: PHYSICIAN ASSISTANT

## 2025-06-16 RX ORDER — FAMOTIDINE 20 MG/1
20 TABLET, FILM COATED ORAL 2 TIMES DAILY
Qty: 60 TABLET | Refills: 0 | Status: SHIPPED | OUTPATIENT
Start: 2025-06-16

## 2025-06-16 RX ADMIN — LIDOCAINE HYDROCHLORIDE 30 ML: 20 SOLUTION ORAL; TOPICAL at 13:00

## 2025-06-16 ASSESSMENT — ENCOUNTER SYMPTOMS
FEVER: 0
CHILLS: 0
NAUSEA: 1
DIARRHEA: 1
SHORTNESS OF BREATH: 1
MYALGIAS: 1
SORE THROAT: 1
ABDOMINAL PAIN: 0
VOMITING: 1
HEMOPTYSIS: 0
SPUTUM PRODUCTION: 1
COUGH: 1
PALPITATIONS: 0
WHEEZING: 0

## 2025-06-16 ASSESSMENT — PAIN DESCRIPTION - PAIN TYPE: TYPE: ACUTE PAIN

## 2025-06-16 ASSESSMENT — HEART SCORE
HEART SCORE: 2
AGE: <45
RISK FACTORS: 1-2 RISK FACTORS
TROPONIN: LESS THAN OR EQUAL TO NORMAL LIMIT
ECG: NON-SPECIFIC REPOLARIZATION DISTURBANCE
HISTORY: SLIGHTLY SUSPICIOUS

## 2025-06-16 NOTE — ED NOTES
DC instructions and medications discussed with patient at bedside. All questions answered at this time. VSS. No IV in place at this time.  self to home.

## 2025-06-16 NOTE — ED TRIAGE NOTES
"Chief Complaint   Patient presents with    Chest Pain     Started today while driving at 0630, pt also had x1 emesis after CP started. +SOB, sore chest pain when taking a deep breath, light headed. Denies fevers or abd pain      Body Aches     Starting on Friday- body aches, sore throat, congestion.      /73   Pulse (!) 58   Temp 36.6 °C (97.9 °F) (Temporal)   Resp 18   Ht 1.676 m (5' 6\")   Wt 76.7 kg (169 lb 1.5 oz)   LMP 06/16/2025 (Exact Date)   SpO2 96%   BMI 27.29 kg/m²     "

## 2025-06-16 NOTE — PROGRESS NOTES
"Subjective     Karrie Yousif is a 24 y.o. female who presents with Cough (X started Friday sore throat, cough, congestion, body aches, today SOB, feels like there is a weight on her chest, vomited and had diarrhea )            Patient reports pharyngitis that started 3 days ago. Today she woke up with cough, chest tightness, and substernal chest pain. Patient rates chest pain as 4-5/10. Pain occurs at rest and is not worse with coughing,  She reports body aches, shortness of breath, vomiting, diarrhea and productive mucous. She denies wheezing. She has not had a fever.     Past Medical History[1]      Past Surgical History[2]    Family History   Problem Relation Age of Onset    Throat Cancer Mother     Breast Cancer Maternal Aunt     Diabetes Maternal Aunt      Allergies:  Cat hair extract    Medications, Allergies, and current problem list reviewed today in Epic      Review of Systems   Constitutional:  Positive for malaise/fatigue. Negative for chills and fever.   HENT:  Positive for sore throat. Negative for congestion and ear pain.    Respiratory:  Positive for cough, sputum production and shortness of breath. Negative for hemoptysis and wheezing.    Cardiovascular:  Positive for chest pain. Negative for palpitations and leg swelling.   Gastrointestinal:  Positive for diarrhea, nausea and vomiting. Negative for abdominal pain.   Musculoskeletal:  Positive for myalgias.        All other systems reviewed and are negative.         Objective     BP 90/60   Pulse (!) 51   Temp 36.3 °C (97.4 °F)   Resp 14   Ht 1.676 m (5' 6\")   Wt 76.7 kg (169 lb)   LMP 05/16/2025   SpO2 95%   BMI 27.28 kg/m²      Physical Exam  Constitutional:       General: She is not in acute distress.     Appearance: Normal appearance. She is not ill-appearing.   HENT:      Head: Normocephalic and atraumatic.      Nose: Nose normal.      Mouth/Throat:      Mouth: Mucous membranes are moist.      Pharynx: No oropharyngeal exudate or " posterior oropharyngeal erythema.   Eyes:      Conjunctiva/sclera: Conjunctivae normal.   Cardiovascular:      Rate and Rhythm: Regular rhythm. Bradycardia present.      Heart sounds: Normal heart sounds.   Pulmonary:      Effort: Pulmonary effort is normal. No respiratory distress.      Breath sounds: No stridor. No wheezing, rhonchi or rales.   Skin:     General: Skin is warm and dry.   Neurological:      General: No focal deficit present.      Mental Status: She is alert and oriented to person, place, and time.   Psychiatric:         Mood and Affect: Mood normal.         Behavior: Behavior normal.         Thought Content: Thought content normal.         Judgment: Judgment normal.             6/16/2025 10:09 AM     HISTORY/REASON FOR EXAM:  Cough     TECHNIQUE/EXAM DESCRIPTION: PA and lateral views of the chest.     COMPARISON:  None.     FINDINGS:  The lungs are clear.  The cardiac silhouette is normal in size.  No effusions or pneumothoraces are present.  There are no significant osseous abnormalities.  The visualized portions of the upper abdomen are within normal limits.        IMPRESSION:     NEGATIVE TWO VIEWS OF THE CHEST.            EKG: Sinus bradycardia at 49 BPM, inverted t-waves in V1, V2, V3         Assessment & Plan  Chest pain, unspecified type    Orders:    EKG - Clinic Performed    DX-CHEST-2 VIEWS; Future    POCT CoV-2, Flu A/B, RSV by PCR- negative     Acute cough         T wave inversion in EKG          At this time, I feel the patient requires a higher level of care including closer monitoring, stat lab work and/or imaging for further evaluation for complaints of acute onset/active chest pain and abnormal findings on EKG.   This has been discussed with the patient and they state agreement and understanding.  I offered the patient an ambulance ride and  the patient is declining at this time. The patient is in no acute distress upon clinic departure and will go directly to ED without delay.        Patient is stable upon discharge.    Rosalinda Garcia P.A.-C.               [1]   Past Medical History:  Diagnosis Date    Migraine    [2]   Past Surgical History:  Procedure Laterality Date    PB KNEE SCOPE,AID ANT CRUCIATE REPAIR Right 7/17/2023    Procedure: RIGHT KNEE ARTHROSCOPY ANTERIOR CRUCIATE LIGAMENT RECONSTRUCTION WITH HAMSTRING AUTOGRAFT, REPAIRS AS INDICATED;  Surgeon: Hussein Ferreira M.D.;  Location: [facility];  Service: Orthopedics

## 2025-06-16 NOTE — ED PROVIDER NOTES
ED Provider Note    CHIEF COMPLAINT  Chief Complaint   Patient presents with    Chest Pain     Started today while driving at 0630, pt also had x1 emesis after CP started. +SOB, sore chest pain when taking a deep breath, light headed. Denies fevers or abd pain      Body Aches     Starting on Friday- body aches, sore throat, congestion.        EXTERNAL RECORDS REVIEWED  Outpatient Notes note from urgent care from earlier today, patient with questionable abnormal EKG and acute chest pain.  Chest x-ray was unremarkable.  COVID test was negative    HPI/ROS      Karrie Yousif is a 24 y.o. female who presents with chief complaint of chest pain.  Chest pain began at around 630 this morning.  Chest pain began when patient woke up.  She states that she had a mild sore throat, and some burning in her stomach that was going up her chest.  Pain is not significantly worse upon lying down.  Pain has no association with exertion. Reports they can walk without any difficulty or symptom provocation   Pain with no associated SOB  Denies associated nausea or diaphoresis  denies decreased exertional capacity  denies associated lower extremity edema, orthopnea or PND  denies tearing quality of pain, no radiation to back  denies hemoptysis  denies surgery, fracture or casted extremity within the last mo  denies use of estrogen containing OCP, patient reports that she is not pregnant.  She is currently on her period.  denies hx of VTE. denies active or recently active ca    PAST MEDICAL HISTORY   has a past medical history of Migraine.    SURGICAL HISTORY   has a past surgical history that includes knee scope,aid ant cruciate repair (Right, 7/17/2023).    FAMILY HISTORY  Family History   Problem Relation Age of Onset    Throat Cancer Mother     Breast Cancer Maternal Aunt     Diabetes Maternal Aunt        SOCIAL HISTORY  Social History     Tobacco Use    Smoking status: Never    Smokeless tobacco: Never   Vaping Use    Vaping  "status: Never Used   Substance and Sexual Activity    Alcohol use: Yes     Comment: occasional    Drug use: No    Sexual activity: Yes     Partners: Male     Birth control/protection: None       CURRENT MEDICATIONS  Home Medications       Reviewed by Maria Victoria Simpson R.N. (Registered Nurse) on 06/16/25 at 1152  Med List Status: Not Addressed     Medication Last Dose Status   sertraline (ZOLOFT) 50 MG Tab  Active                    ALLERGIES  Allergies[1]    PHYSICAL EXAM  VITAL SIGNS: /73   Pulse (!) 58   Temp 36.6 °C (97.9 °F) (Temporal)   Resp 18   Ht 1.676 m (5' 6\")   Wt 76.7 kg (169 lb 1.5 oz)   LMP 06/16/2025 (Exact Date)   SpO2 96%   BMI 27.29 kg/m²    Physical Exam  Constitutional:       Appearance: She is well-developed.   HENT:      Head: Normocephalic and atraumatic.   Eyes:      Pupils: Pupils are equal, round, and reactive to light.   Cardiovascular:      Rate and Rhythm: Normal rate and regular rhythm.   Pulmonary:      Effort: Pulmonary effort is normal. No accessory muscle usage or respiratory distress.      Breath sounds: Normal breath sounds.   Abdominal:      General: Bowel sounds are normal.      Palpations: Abdomen is soft. There is no mass.      Tenderness: There is no abdominal tenderness.   Musculoskeletal:         General: Normal range of motion.   Skin:     General: Skin is warm.      Capillary Refill: Capillary refill takes less than 2 seconds.   Neurological:      General: No focal deficit present.      Mental Status: She is alert.   Psychiatric:         Mood and Affect: Mood normal. Mood is not anxious.           EKG/LABS  Results for orders placed or performed during the hospital encounter of 06/16/25   EKG    Collection Time: 06/16/25 12:29 PM   Result Value Ref Range    Report       [facility]    Test Date:  2025-06-16  Pt Name:    GRACE RAMOS                Department: [facility]  MRN:        [id number]                      " Room:  Gender:     Female                       Technician: [id number]  :        2000                   Requested By:ER TRIAGE PROTOCOL  Order #:    [id number]                    Reading MD: Eliezer Bolivar MD    Measurements  Intervals                                Axis  Rate:       58                           P:          21  VT:         145                          QRS:        31  QRSD:       80                           T:          -3  QT:         429  QTc:        422    Interpretive Statements  EKG is normal sinus rhythm, normal axis normal intervals, no ST elevation, no  ST depression  Electronically Signed On 2025 12:29:31 PDT by Eliezer Bolivar MD     CBC WITH DIFFERENTIAL    Collection Time: 25 12:58 PM   Result Value Ref Range    WBC 4.1 (L) 4.8 - 10.8 K/uL    RBC 4.50 4.20 - 5.40 M/uL    Hemoglobin 13.4 12.0 - 16.0 g/dL    Hematocrit 39.8 37.0 - 47.0 %    MCV 88.4 81.4 - 97.8 fL    MCH 29.8 27.0 - 33.0 pg    MCHC 33.7 32.2 - 35.5 g/dL    RDW 41.0 35.9 - 50.0 fL    Platelet Count 256 164 - 446 K/uL    MPV 11.7 9.0 - 12.9 fL    Neutrophils-Polys 41.70 (L) 44.00 - 72.00 %    Lymphocytes 39.60 22.00 - 41.00 %    Monocytes 13.00 0.00 - 13.40 %    Eosinophils 4.30 0.00 - 6.90 %    Basophils 1.20 0.00 - 1.80 %    Immature Granulocytes 0.20 0.00 - 0.90 %    Nucleated RBC 0.00 0.00 - 0.20 /100 WBC    Neutrophils (Absolute) 1.72 (L) 1.82 - 7.42 K/uL    Lymphs (Absolute) 1.64 1.00 - 4.80 K/uL    Monos (Absolute) 0.54 0.00 - 0.85 K/uL    Eos (Absolute) 0.18 0.00 - 0.51 K/uL    Baso (Absolute) 0.05 0.00 - 0.12 K/uL    Immature Granulocytes (abs) 0.01 0.00 - 0.11 K/uL    NRBC (Absolute) 0.00 K/uL   CMP    Collection Time: 25 12:58 PM   Result Value Ref Range    Sodium 137 135 - 145 mmol/L    Potassium 3.9 3.6 - 5.5 mmol/L    Chloride 105 96 - 112 mmol/L    Co2 21 20 - 33 mmol/L    Anion Gap 11.0 7.0 - 16.0    Glucose 96 65 - 99 mg/dL    Bun 11 8 - 22 mg/dL    Creatinine 0.68 0.50 - 1.40 mg/dL     Calcium 9.0 8.5 - 10.5 mg/dL    Correct Calcium 9.0 8.5 - 10.5 mg/dL    AST(SGOT) 38 12 - 45 U/L    ALT(SGPT) 39 2 - 50 U/L    Alkaline Phosphatase 68 30 - 99 U/L    Total Bilirubin 0.3 0.1 - 1.5 mg/dL    Albumin 4.0 3.2 - 4.9 g/dL    Total Protein 6.8 6.0 - 8.2 g/dL    Globulin 2.8 1.9 - 3.5 g/dL    A-G Ratio 1.4 g/dL   TROPONIN    Collection Time: 06/16/25 12:58 PM   Result Value Ref Range    Troponin T <6 6 - 19 ng/L   ESTIMATED GFR    Collection Time: 06/16/25 12:58 PM   Result Value Ref Range    GFR (CKD-EPI) 124 >60 mL/min/1.73 m 2       I have independently interpreted this EKG            COURSE & MEDICAL DECISION MAKING    ASSESSMENT, COURSE AND PLAN  Care Narrative:     Patient has very reassuring exam.  EKG fails to reveal any concerning findings, she has some nonspecific T wave inversions.  She has no strong family history of heart disease, she is not a smoker, she has no other major medical problems.  Patient was sent from urgent care for further risk stratification therefore we will send troponin though my suspicion of ACS remains very low here.  Patient denies any vomiting without any associated shortness of breath or cardiopulmonary symptoms otherwise.  Patient with some viral symptoms, symptoms could be secondary to viral syndrome, or pericarditis, also possible gastritis.  Will give GI cocktail.  Bedside ultrasound performed, there is no evidence of pericardial effusion.  Patient is PERC negative  Patient feeling improved following GI cocktail.  Patient home with Pepcid.      CHEST PAIN:   HEART Score for Major Cardiac Events  HEART Score     History: Slightly suspicious  ECG: Non-specific repolarization disturbance  Age: <45  Risk Factors: 1-2 risk factors  Troponin: Less than or equal to normal limit    Heart Score: 2    Total Score   0-3 Points = Low Score, risk of MACE 0.9-1.7%.  4-6 Points = Moderate Score, risk of MACE 12-16.6%  7-10 Points = High Score, risk of MACE 50-65%               DISPOSITION AND DISCUSSIONS      Decision tools and prescription drugs considered including, but not limited to: Heart score, PERC    FINAL DIAGNOSIS  Nonspecific chest pain       [1]   Allergies  Allergen Reactions    Cat Hair Extract Unspecified     Allegic to cats

## 2025-06-17 ENCOUNTER — HOSPITAL ENCOUNTER (EMERGENCY)
Facility: MEDICAL CENTER | Age: 25
End: 2025-06-18
Attending: STUDENT IN AN ORGANIZED HEALTH CARE EDUCATION/TRAINING PROGRAM
Payer: OTHER GOVERNMENT

## 2025-06-17 ENCOUNTER — APPOINTMENT (OUTPATIENT)
Dept: RADIOLOGY | Facility: MEDICAL CENTER | Age: 25
End: 2025-06-17
Attending: STUDENT IN AN ORGANIZED HEALTH CARE EDUCATION/TRAINING PROGRAM
Payer: OTHER GOVERNMENT

## 2025-06-17 DIAGNOSIS — R07.9 CHEST PAIN, UNSPECIFIED TYPE: Primary | ICD-10-CM

## 2025-06-17 LAB
EKG IMPRESSION: NORMAL
THINPREP PAP, CYTOLOGY NL11781: NORMAL

## 2025-06-17 PROCEDURE — 71046 X-RAY EXAM CHEST 2 VIEWS: CPT

## 2025-06-17 PROCEDURE — 85027 COMPLETE CBC AUTOMATED: CPT

## 2025-06-17 PROCEDURE — 36415 COLL VENOUS BLD VENIPUNCTURE: CPT

## 2025-06-17 PROCEDURE — 99285 EMERGENCY DEPT VISIT HI MDM: CPT

## 2025-06-17 PROCEDURE — 84484 ASSAY OF TROPONIN QUANT: CPT

## 2025-06-17 PROCEDURE — 93005 ELECTROCARDIOGRAM TRACING: CPT | Mod: TC | Performed by: STUDENT IN AN ORGANIZED HEALTH CARE EDUCATION/TRAINING PROGRAM

## 2025-06-17 PROCEDURE — 93005 ELECTROCARDIOGRAM TRACING: CPT | Mod: TC

## 2025-06-17 RX ORDER — FLUCONAZOLE 150 MG/1
TABLET ORAL
Qty: 2 TABLET | Refills: 0 | Status: SHIPPED | OUTPATIENT
Start: 2025-06-17

## 2025-06-17 ASSESSMENT — FIBROSIS 4 INDEX: FIB4 SCORE: 0.57

## 2025-06-18 VITALS
WEIGHT: 169 LBS | TEMPERATURE: 98.6 F | DIASTOLIC BLOOD PRESSURE: 55 MMHG | BODY MASS INDEX: 27.28 KG/M2 | SYSTOLIC BLOOD PRESSURE: 110 MMHG | HEART RATE: 52 BPM | RESPIRATION RATE: 14 BRPM | OXYGEN SATURATION: 95 %

## 2025-06-18 LAB
ERYTHROCYTE [DISTWIDTH] IN BLOOD BY AUTOMATED COUNT: 42 FL (ref 35.9–50)
HCT VFR BLD AUTO: 41.7 % (ref 37–47)
HGB BLD-MCNC: 13.6 G/DL (ref 12–16)
MCH RBC QN AUTO: 29.3 PG (ref 27–33)
MCHC RBC AUTO-ENTMCNC: 32.6 G/DL (ref 32.2–35.5)
MCV RBC AUTO: 89.9 FL (ref 81.4–97.8)
PLATELET # BLD AUTO: 257 K/UL (ref 164–446)
PMV BLD AUTO: 11.9 FL (ref 9–12.9)
RBC # BLD AUTO: 4.64 M/UL (ref 4.2–5.4)
TROPONIN T SERPL-MCNC: <6 NG/L (ref 6–19)
WBC # BLD AUTO: 5.3 K/UL (ref 4.8–10.8)

## 2025-06-18 NOTE — ED NOTES
Patient is stable for discharge at this time, anticipatory guidance provided, close follow-up encouraged, and ED return instructions have been discussed. Patient is both agreeable to the disposition and plan and discharged home in ambulatory state, in good condition.      Follow-up education provided, Pt verbalized understanding;

## 2025-06-18 NOTE — ED PROVIDER NOTES
CHIEF COMPLAINT  Chief Complaint   Patient presents with    Chest Pain     PT who was seen yesterday for the same complaint returns to the ER d/t continued chest pain. PT notes that the pain has not improved or gotten worse since yesterday.        LIMITATION TO HISTORY   Select:     SANDIP Yousif is a 24 y.o. female who presents to the Emergency Department with a prior diagnosis of dysautonomia and history of pneumonia presents with persistent chest and back pain following a recent ED visit. She reports the initial burning sensation has improved, but now describes ongoing soreness beneath her breastbone, worsened by leaning or lifting, and a constant pain in the mid back, particularly at the level of her bra line. She has associated symptoms of dry cough, diarrhea, and intermittent vomiting but denies fevers. She lost her voice recently and notes that deep inspiration exacerbates the pain. She denies smoking or recent illness in the home, though she vapes occasionally. Pain is not notably worsened by palpation, but aggravated by movement and possibly after eating. She is taking Pepcid with some relief but no additional medications. No known family history of cardiac disease.        OUTSIDE HISTORIAN(S):  Select:    EXTERNAL RECORDS REVIEWED  Select:       PAST MEDICAL HISTORY  Past Medical History[1]  .    SURGICAL HISTORY  Past Surgical History[2]      FAMILY HISTORY  Family History   Problem Relation Age of Onset    Throat Cancer Mother     Breast Cancer Maternal Aunt     Diabetes Maternal Aunt           SOCIAL HISTORY  Social History     Socioeconomic History    Marital status: Single     Spouse name: Not on file    Number of children: Not on file    Years of education: Not on file    Highest education level: Not on file   Occupational History    Not on file   Tobacco Use    Smoking status: Never    Smokeless tobacco: Never   Vaping Use    Vaping status: Never Used   Substance and Sexual Activity     Alcohol use: Yes     Comment: occasional    Drug use: No    Sexual activity: Yes     Partners: Male     Birth control/protection: None   Other Topics Concern    Behavioral problems Not Asked    Interpersonal relationships Not Asked    Sad or not enjoying activities Not Asked    Suicidal thoughts Not Asked    Poor school performance Not Asked    Reading difficulties Not Asked    Speech difficulties Not Asked    Writing difficulties Not Asked    Inadequate sleep Not Asked    Excessive TV viewing Not Asked    Excessive video game use Not Asked    Inadequate exercise Not Asked    Sports related Not Asked    Poor diet Not Asked    Family concerns for drug/alcohol abuse Not Asked    Poor oral hygiene Not Asked    Bike safety Not Asked    Family concerns vehicle safety Not Asked   Social History Narrative    Not on file     Social Drivers of Health     Financial Resource Strain: Not on file   Food Insecurity: Not on file   Transportation Needs: Not on file   Physical Activity: Not on file   Stress: Not on file   Social Connections: Not on file   Intimate Partner Violence: Not on file   Housing Stability: Not on file         CURRENT MEDICATIONS  Medications Ordered Prior to Encounter[3]        ALLERGIES  Allergies[4]    PHYSICAL EXAM  VITAL SIGNS:/55   Pulse (!) 52   Temp 37 °C (98.6 °F) (Temporal)   Resp 14   Wt 76.7 kg (169 lb)   LMP 06/16/2025 (Exact Date)   SpO2 95%   BMI 27.28 kg/m²       GENERAL: Awake and alert  HEAD: Normocephalic and atraumatic  NECK: Normal range of motion, without meningismus  EYES: Pupils Equal, Round, Reactive to Light, extraocular movements intact, conjunctiva white  ENT: Mucous membranes moist, oropharynx clear  PULMONARY: Normal effort, clear to auscultation  CARDIOVASCULAR: No murmurs, clicks or rubs, peripheral pulses 2+  ABDOMINAL: Soft, non-tender, no guarding or rigidity present, no pulsatile masses  BACK: no midline tenderness, no costovertebral tenderness  NEUROLOGICAL:  Grossly non-focal neurological examination, speech normal, gait normal  EXTREMITIES: No edema, normal to inspection  SKIN: Warm and dry.  PSYCHIATRIC: Affect is appropriate    DIAGNOSTIC STUDIES / PROCEDURES  EKG        LABS  Labs Reviewed   CBC WITHOUT DIFFERENTIAL   TROPONIN         RADIOLOGY  I have independently interpreted the diagnostic imaging associated with this visit and am waiting the final reading from the radiologist.   My preliminary interpretation is as follows: no pneumonia    Formal Radiologist interpretation:  DX-CHEST-2 VIEWS   Final Result         1.  No acute cardiopulmonary disease.          COURSE & MEDICAL DECISION MAKING    ED COURSE:        INTERVENTIONS BY ME:  Medications - No data to display    Response on recheck:      INITIAL ASSESSMENT, COURSE AND PLAN  Care Narrative:   The patient was ruled out for PE by PERC criteria:    AGE < 50  No estrogens, BCPs, recent surgery (4 weeks)  No hx of: DVT/PE or hemoptysis  No unilateral leg swelling  Pulse Ox > 94% and HR <100        HEART Score for Major Cardiac Events  HEART Score     History:  0  EC  Age:  0  Risk Factors:  0  Troponin:  0    Heart Score:  1      Interpretation of HEART Score   Total Score   0-3 Points = Low Score, risk of MACE 0.9-1.7%.  4-6 Points = Moderate Score, risk of MACE 12-16.6%  7-10 Points = High Score, risk of MACE 50-65%                This 24-year-old female with a history of dysautonomia presents with persistent, non-radiating chest and mid-back pain that is positional and movement-associated, along with dry cough, intermittent vomiting, and mild GI symptoms. She was seen the prior day without clinical deterioration. Vitals were notable for bradycardia but otherwise stable. Physical exam showed no signs of respiratory distress or focal findings. ECG and troponin were unremarkable. Chest X-ray revealed no acute cardiopulmonary disease.    Given her age, lack of risk factors, normal oxygenation, and HR < 100,  she met all PERC criteria, effectively ruling out pulmonary embolism without the need for D-dimer or CT. Her HEART score was 1, placing her in the low-risk category for major cardiac events. Pain is likely musculoskeletal or related to viral inflammation or reflux No evidence of life-threatening pathology was found. She was discharged in stable condition with education and clear return precautions.                       ADDITIONAL PROBLEM LIST    DISPOSITION AND DISCUSSIONS  Discussion of management with other [facility] or appropriate source(s): None     I have discussed management of the patient with the following physicians and LISSETTE's:      Escalation of care considered, and ultimately not performed:acute inpatient care management, however at this time, the patient is most appropriate for outpatient management    Barriers to care at this time, including but not limited to:     Decision tools and prescription drugs considered including, but not limited to:      Medication List        ASK your doctor about these medications        Instructions   famotidine 20 MG Tabs  Commonly known as: Pepcid   Take 1 Tablet by mouth 2 times a day.  Dose: 20 mg     fluconazole 150 MG tablet  Commonly known as: Diflucan   Take one tablet once then again in 72 hours if not improved     sertraline 50 MG Tabs  Commonly known as: Zoloft   Take 50 mg by mouth every day.  Dose: 50 mg                FINAL DIAGNOSIS  1. Chest pain, unspecified type             Electronically signed by: Tao Skinner DO ,1:08 AM 06/17/25         [1]   Past Medical History:  Diagnosis Date    Migraine    [2]   Past Surgical History:  Procedure Laterality Date    PB KNEE SCOPE,AID ANT CRUCIATE REPAIR Right 7/17/2023    Procedure: RIGHT KNEE ARTHROSCOPY ANTERIOR CRUCIATE LIGAMENT RECONSTRUCTION WITH HAMSTRING AUTOGRAFT, REPAIRS AS INDICATED;  Surgeon: Hussein Ferreira M.D.;  Location: [facility];  Service: Orthopedics   [3]   No current  facility-administered medications on file prior to encounter.     Current Outpatient Medications on File Prior to Encounter   Medication Sig Dispense Refill    fluconazole (DIFLUCAN) 150 MG tablet Take one tablet once then again in 72 hours if not improved 2 Tablet 0    famotidine (PEPCID) 20 MG Tab Take 1 Tablet by mouth 2 times a day. 60 Tablet 0    sertraline (ZOLOFT) 50 MG Tab Take 50 mg by mouth every day. (Patient not taking: Reported on 6/16/2025)     [4]   Allergies  Allergen Reactions    Cat Hair Extract Unspecified     Allegic to cats

## 2025-09-09 ENCOUNTER — APPOINTMENT (OUTPATIENT)
Dept: MEDICAL GROUP | Facility: MEDICAL CENTER | Age: 25
End: 2025-09-09
Payer: OTHER GOVERNMENT